# Patient Record
Sex: FEMALE | Race: WHITE | Employment: UNEMPLOYED | URBAN - METROPOLITAN AREA
[De-identification: names, ages, dates, MRNs, and addresses within clinical notes are randomized per-mention and may not be internally consistent; named-entity substitution may affect disease eponyms.]

---

## 2022-01-06 PROBLEM — E75.23: Status: ACTIVE | Noted: 2022-01-06

## 2022-01-06 PROBLEM — Z34.90 PREGNANCY: Status: ACTIVE | Noted: 2022-01-06

## 2022-01-06 PROBLEM — R87.619 ABNORMAL PAP SMEAR OF CERVIX: Status: ACTIVE | Noted: 2022-01-06

## 2022-01-06 PROBLEM — Z98.891 HISTORY OF C-SECTION: Status: ACTIVE | Noted: 2022-01-06

## 2022-01-06 PROBLEM — O34.219 HX SUCCESSFUL VBAC (VAGINAL BIRTH AFTER CESAREAN), CURRENTLY PREGNANT: Status: ACTIVE | Noted: 2022-01-06

## 2022-01-06 PROBLEM — Q31.5 LARYNGOMALACIA: Status: ACTIVE | Noted: 2022-01-06

## 2022-01-10 PROBLEM — Z67.91 RH NEGATIVE STATUS DURING PREGNANCY: Status: ACTIVE | Noted: 2022-01-10

## 2022-01-10 PROBLEM — O26.899 RH NEGATIVE STATUS DURING PREGNANCY: Status: ACTIVE | Noted: 2022-01-10

## 2022-03-02 ENCOUNTER — HOSPITAL ENCOUNTER (OUTPATIENT)
Age: 27
Discharge: HOME | End: 2022-03-02
Attending: OBSTETRICS & GYNECOLOGY | Admitting: OBSTETRICS & GYNECOLOGY
Payer: COMMERCIAL

## 2022-03-02 VITALS
BODY MASS INDEX: 20.2 KG/M2 | WEIGHT: 114 LBS | SYSTOLIC BLOOD PRESSURE: 106 MMHG | DIASTOLIC BLOOD PRESSURE: 55 MMHG | RESPIRATION RATE: 18 BRPM | HEIGHT: 63 IN | HEART RATE: 75 BPM

## 2022-03-02 PROBLEM — O26.892 VAGINAL DISCHARGE IN PREGNANCY IN SECOND TRIMESTER: Status: ACTIVE | Noted: 2022-03-02

## 2022-03-02 PROBLEM — N89.8 VAGINAL DISCHARGE IN PREGNANCY IN SECOND TRIMESTER: Status: ACTIVE | Noted: 2022-03-02

## 2022-03-02 LAB
A1 MICROGLOB PLACENTAL VAG QL: NEGATIVE
CONTROL LINE PRESENT?: NORMAL
EXPIRATION DATE: NORMAL
GLUCOSE, GLUUPC: NEGATIVE
INTERNAL NEGATIVE CONTROL: NORMAL
KETONES UR-MCNC: NORMAL MG/DL
KIT LOT NO.: NORMAL
PROT UR QL: NEGATIVE

## 2022-03-02 PROCEDURE — 75810000275 HC EMERGENCY DEPT VISIT NO LEVEL OF CARE

## 2022-03-02 PROCEDURE — 81002 URINALYSIS NONAUTO W/O SCOPE: CPT | Performed by: OBSTETRICS & GYNECOLOGY

## 2022-03-02 PROCEDURE — 84112 EVAL AMNIOTIC FLUID PROTEIN: CPT | Performed by: OBSTETRICS & GYNECOLOGY

## 2022-03-02 PROCEDURE — 99284 EMERGENCY DEPT VISIT MOD MDM: CPT

## 2022-03-02 NOTE — PROGRESS NOTES
Patient discharged to home stable with bleeding precautions. Patient encouraged to increase her water intake. Patient to follow up with her primary OB as needed. Questions answered and encouraged.

## 2022-03-02 NOTE — DISCHARGE INSTRUCTIONS
Patient Education   Patient Education        Weeks 14 to 25 of Your Pregnancy: Care Instructions  Overview     During this time, you may start to \"show,\" so that you look pregnant to people around you. You may also notice some changes in your skin, such as itchy spots on your palms or acne on your face. Your baby is now able to pass urine. And your baby's first stool (meconium) is starting to collect in your baby's intestines. Hair is also starting to grow on your baby's head. At your next visit, between weeks 18 and 20, your doctor may do an ultrasound test. The test allows your doctor to check for certain problems. Your doctor can also tell the sex of your baby. So this a good time to think about whether you want to know. Talk to your doctor about getting a flu shot to help keep you healthy during your pregnancy. As your pregnancy moves along, it's common to worry or feel anxious. Your body is changing a lot. And you are thinking about giving birth, the health of your baby, and becoming a parent. You can talk to your doctor about any anxiety and stress you feel. Follow-up care is a key part of your treatment and safety. Be sure to make and go to all appointments, and call your doctor if you are having problems. It's also a good idea to know your test results and keep a list of the medicines you take. How can you care for yourself at home? Reduce stress    · Ask for help with cooking and housekeeping.     · Figure out who or what causes your stress. Avoid these people or situations as much as possible.     · Relax every day. Taking 10- to 15-minute breaks can make a big difference. Take a walk, listen to music, or take a warm bath.     · Learn relaxation techniques at prenatal or yoga class. Or buy a relaxation tape.     · List your fears about having a baby and becoming a parent. Share the list with someone you trust. Decide which worries are really small, and try to let them go.    Exercise    · If you did not exercise much before pregnancy, start slowly. Walking is best. [employer], and do a little more every day.     · Brisk walking, easy jogging, low-impact aerobics, water aerobics, and yoga are good choices. Some sports, such as scuba diving, horseback riding, downhill skiing, gymnastics, and water skiing, are not a good idea.     · Try to do at least 2½ hours a week of moderate exercise, such as a fast walk. One way to do this is to be active 30 minutes a day, at least 5 days a week.     · Wear loose clothing. And wear shoes and a bra that provide good support.     · Warm up and cool down to start and finish your exercise.     · If you want to use weights, be sure to use light weights. They reduce stress on your joints. Stay at the best weight for you    · Experts recommend that you gain about 1 pound a month during the first 3 months of your pregnancy.     · Experts recommend that you gain about 1 pound a week during your last 6 months of pregnancy, for a total weight gain of 25 to 35 pounds.     · If you are underweight, you will need to gain more weight (about 28 to 40 pounds).     · If you are overweight, you may not need to gain as much weight (about 15 to 25 pounds).     · If you are gaining weight too fast, use common sense. Exercise every day, and limit sweets, fast foods, and fats. Choose lean meats, fruits, and vegetables.     · If you are having twins or more, your doctor may refer you to a dietitian. Where can you learn more? Go to http://www.gray.com/  Enter I453 in the search box to learn more about \"Weeks 14 to 18 of Your Pregnancy: Care Instructions. \"  Current as of: June 16, 2021               Content Version: 13.0  © 4840-5087 Healthwise, Incorporated. Care instructions adapted under license by [employer] (which disclaims liability or warranty for this information).  If you have questions about a medical condition or this instruction, always ask your healthcare professional. [place] [address] any warranty or liability for your use of this information. Vaginal Bleeding During Pregnancy: Care Instructions  Overview     It's common to have some vaginal bleeding when you are pregnant. In some cases, the bleeding isn't serious. And there aren't any more problems with the pregnancy. But sometimes bleeding is a sign of a more serious problem. This is more common if the bleeding is heavy or painful. Examples of more serious problems include miscarriage, an ectopic pregnancy, and a problem with the placenta. You may have to see your doctor again to be sure everything is okay. You may also need more tests to find the cause of the bleeding. Home treatment may be all you need. But it depends on what is causing the bleeding. Be sure to tell your doctor if you have any new symptoms or if your symptoms get worse. The doctor has checked you carefully, but problems can develop later. If you notice any problems or new symptoms, get medical treatment right away. Follow-up care is a key part of your treatment and safety. Be sure to make and go to all appointments, and call your doctor if you are having problems. It's also a good idea to know your test results and keep a list of the medicines you take. How can you care for yourself at home? · If your doctor prescribed medicines, take them exactly as directed. Call your doctor if you think you are having a problem with your medicine. · Do not have sex until your doctor says it's okay. · Do not put anything in your vagina until your doctor says it's okay. · Ask your doctor about other activities you can or can't do. · Get a lot of rest. Being pregnant can make you tired. · Eat a healthy diet. Include a lot of peas, beans, and leafy green vegetables. Talk to a dietitian if you need help planning your diet.   · Do not use nonsteroidal anti-inflammatory drugs (NSAIDs), such as ibuprofen (Advil, Motrin), naproxen (Aleve), or aspirin, unless your doctor says it is okay. When should you call for help? Call 911 anytime you think you may need emergency care. For example, call if:    · You passed out (lost consciousness).     · You have severe vaginal bleeding. Call your doctor now or seek immediate medical care if:    · You have any vaginal bleeding.     · You have pain in your belly or pelvis.     · You think that you are in labor.     · You have a sudden release of fluid from your vagina.     · You notice that your baby has stopped moving or is moving much less than normal.   Watch closely for changes in your health, and be sure to contact your doctor if you have any questions or concerns. Where can you learn more? Go to http://www.gray.com/  Enter N833 in the search box to learn more about \"Vaginal Bleeding During Pregnancy: Care Instructions. \"  Current as of: June 16, 2021               Content Version: 13.0  © 2006-2021 Healthwise, Incorporated. Care instructions adapted under license by [employer] (which disclaims liability or warranty for this information). If you have questions about a medical condition or this instruction, always ask your healthcare professional. [address] any warranty or liability for your use of this information.

## 2022-03-02 NOTE — H&P
History & Physical    Name: Chantelle Mendenhall MRN: [id number]  SSN: [id number]    YOB: 1995  Age: 31 y.o. Sex: female        Subjective: lof  HPI: 32 yo  presents at 16+1wks c/o lof throughout the day prior. She denies vb. She denies vaginal itching or burning. She denies cramping. She has a h/o 3 term deliveries. Her pregnancy has been c/b 1 prior CD, Rh neg, 2nd child with laryngomalacia. Estimated Date of Delivery: 22  OB History    Para Term  AB Living   5 3 3   1 3   SAB IAB Ectopic Molar Multiple Live Births           0 3      # Outcome Date GA Lbr Ciro/2nd Weight Sex Delivery Anes PTL Lv   5 Current            4 Term 19 40w0d  3.629 kg M VACD EPI N JOSHUA   3 Term 17 39w0d  2.665 kg F VACD EPI N JOSHUA   2 Term 13 39w0d  3.402 kg F CS-LTranv Spinal N JOSHUA      Complications: Breech presentation, Oligohydramnios   1 AB                Past Medical History:   Diagnosis Date    Abnormal Papanicolaou smear of cervix     had abnormal in last pregnancy. none since.     Postpartum depression     no meds, second pregnancy     Past Surgical History:   Procedure Laterality Date    APPENDECTOMY,W OTHR C      HX  SECTION      HX CYST REMOVAL      HX HERNIA REPAIR      umbilical     Social History     Occupational History    Not on file   Tobacco Use    Smoking status: Never Smoker    Smokeless tobacco: Never Used   Substance and Sexual Activity    Alcohol use: No    Drug use: No    Sexual activity: Yes     Partners: Male     Birth control/protection: None     Family History   Problem Relation Age of Onset    OSTEOARTHRITIS Mother     Diabetes Father     Lung Cancer Father     Other Brother         pre-cancer polyps       Allergies   Allergen Reactions    Adhesive Other (comments)     Severe skin irritation; skin comes off with tape    Dilaudid [Hydromorphone] Itching    Morphine Itching     Prior to Admission medications Medication Sig Start Date End Date Taking? Authorizing Provider   OTHER Takes 2 flinstones daily. Yes Provider, Historical        Review of Systems: Pertinent items are noted in HPI. 10 point ROS is otherwise negative. Objective:     Vitals:  Vitals:    228 22 015   BP: (!) 106/55    Pulse: 75    Resp: 18    Weight:  51.7 kg (114 lb)   Height:  5' 3\" (1.6 m)        Physical Exam:  Patient without distress. Abdomen: soft, nontender  Fundus: soft and non tender  Perineum: blood absent, amniotic fluid absent  SSE: NEFG, normal vaginal mucosa and discharge, no odor, cvx LTC to visual inspection  Lower Extremities:  - Edema No  TAUS: Normal appearing fluid around active fetus  Membranes:  Intact, Amnisure NEGATIVE  Fetal Heart Rate: Doptones 160s    Prenatal Labs:   Lab Results   Component Value Date/Time    Rubella, External immune 2022 12:00 AM    HBsAg, External negative 2022 12:00 AM    HIV, External NR 2022 12:00 AM    RPR, External NR 2022 12:00 AM    Gonorrhea, External negative 2022 12:00 AM    Chlamydia, External negative 2022 12:00 AM    ABO,Rh A negative 2022 12:00 AM         Assessment/Plan: 32 yo  at 16+1wks presented with vaginal discharge. No evidence of rupture. Reassuring fetal assessment.       Active Problems:    Vaginal discharge in pregnancy in second trimester (3/2/2022)         Plan:  Discharge to home  Routine OB follow up and precautions

## 2022-03-02 NOTE — PROGRESS NOTES
Patient presents to triage from ED with complaints of a gush of fluid that has continued to trickle states that her panties were wet when she took them off. Patient has not felt any fetal movement yet. FHT's with doppler 162, having some lower back pain. Patient states that she her second OB visit yesterday and everything was fine. Patient did have intercourse 2 days ago denies any bleeding.

## 2022-03-02 NOTE — PROGRESS NOTES
Dr Sumit Smith in room to assess patient, speculum exam done. No fluid noted normal vaginal discharge per Dr Sumit Smith.

## 2022-03-18 PROBLEM — O34.219 HX SUCCESSFUL VBAC (VAGINAL BIRTH AFTER CESAREAN), CURRENTLY PREGNANT: Status: ACTIVE | Noted: 2022-01-06

## 2022-03-18 PROBLEM — Q31.5 LARYNGOMALACIA: Status: ACTIVE | Noted: 2022-01-06

## 2022-03-19 PROBLEM — Z98.891 HISTORY OF C-SECTION: Status: ACTIVE | Noted: 2022-01-06

## 2022-03-19 PROBLEM — R87.619 ABNORMAL PAP SMEAR OF CERVIX: Status: ACTIVE | Noted: 2022-01-06

## 2022-03-19 PROBLEM — Z67.91 RH NEGATIVE STATUS DURING PREGNANCY: Status: ACTIVE | Noted: 2022-01-10

## 2022-03-19 PROBLEM — O26.899 RH NEGATIVE STATUS DURING PREGNANCY: Status: ACTIVE | Noted: 2022-01-10

## 2022-03-19 PROBLEM — E75.23: Status: ACTIVE | Noted: 2022-01-06

## 2022-03-20 PROBLEM — N89.8 VAGINAL DISCHARGE IN PREGNANCY IN SECOND TRIMESTER: Status: ACTIVE | Noted: 2022-03-02

## 2022-03-20 PROBLEM — O26.892 VAGINAL DISCHARGE IN PREGNANCY IN SECOND TRIMESTER: Status: ACTIVE | Noted: 2022-03-02

## 2022-03-20 PROBLEM — Z34.90 PREGNANCY: Status: ACTIVE | Noted: 2022-01-06

## 2022-05-10 ENCOUNTER — HOSPITAL ENCOUNTER (EMERGENCY)
Age: 27
Discharge: HOME | End: 2022-05-10
Attending: OBSTETRICS & GYNECOLOGY | Admitting: OBSTETRICS & GYNECOLOGY
Payer: COMMERCIAL

## 2022-05-10 ENCOUNTER — APPOINTMENT (OUTPATIENT)
Dept: GENERAL RADIOLOGY | Age: 27
End: 2022-05-10
Attending: EMERGENCY MEDICINE
Payer: COMMERCIAL

## 2022-05-10 ENCOUNTER — APPOINTMENT (OUTPATIENT)
Dept: CT IMAGING | Age: 27
End: 2022-05-10
Attending: EMERGENCY MEDICINE
Payer: COMMERCIAL

## 2022-05-10 VITALS
RESPIRATION RATE: 16 BRPM | OXYGEN SATURATION: 99 % | SYSTOLIC BLOOD PRESSURE: 109 MMHG | TEMPERATURE: 98 F | HEART RATE: 79 BPM | DIASTOLIC BLOOD PRESSURE: 52 MMHG

## 2022-05-10 DIAGNOSIS — O99.013 MATERNAL IRON DEFICIENCY ANEMIA COMPLICATING PREGNANCY IN THIRD TRIMESTER: ICD-10-CM

## 2022-05-10 DIAGNOSIS — D50.9 MATERNAL IRON DEFICIENCY ANEMIA COMPLICATING PREGNANCY IN THIRD TRIMESTER: ICD-10-CM

## 2022-05-10 DIAGNOSIS — R55 NEAR SYNCOPE: ICD-10-CM

## 2022-05-10 DIAGNOSIS — R07.9 CHEST PAIN, UNSPECIFIED TYPE: Primary | ICD-10-CM

## 2022-05-10 PROBLEM — O26.893 HEADACHE IN PREGNANCY, ANTEPARTUM, THIRD TRIMESTER: Status: ACTIVE | Noted: 2022-05-10

## 2022-05-10 PROBLEM — R51.9 HEADACHE IN PREGNANCY, ANTEPARTUM, THIRD TRIMESTER: Status: ACTIVE | Noted: 2022-05-10

## 2022-05-10 PROBLEM — R42 POSTURAL DIZZINESS WITH PRESYNCOPE: Status: ACTIVE | Noted: 2022-05-10

## 2022-05-10 PROBLEM — O99.891 CHEST PAIN DURING PREGNANCY: Status: ACTIVE | Noted: 2022-05-10

## 2022-05-10 LAB
ALBUMIN SERPL-MCNC: 2.6 G/DL (ref 3.5–5)
ALBUMIN/GLOB SERPL: 0.7 {RATIO} (ref 1.2–3.5)
ALP SERPL-CCNC: 66 U/L (ref 50–130)
ALT SERPL-CCNC: 10 U/L (ref 12–65)
ANION GAP SERPL CALC-SCNC: 8 MMOL/L (ref 7–16)
AST SERPL-CCNC: 10 U/L (ref 15–37)
ATRIAL RATE: 81 BPM
ATRIAL RATE: 86 BPM
B PERT DNA SPEC QL NAA+PROBE: NOT DETECTED
BILIRUB SERPL-MCNC: 0.3 MG/DL (ref 0.2–1.1)
BORDETELLA PARAPERTUSSIS PCR, BORPAR: NOT DETECTED
BUN SERPL-MCNC: 7 MG/DL (ref 6–23)
C PNEUM DNA SPEC QL NAA+PROBE: NOT DETECTED
CALCIUM SERPL-MCNC: 8.4 MG/DL (ref 8.3–10.4)
CALCULATED P AXIS, ECG09: 49 DEGREES
CALCULATED P AXIS, ECG09: 56 DEGREES
CALCULATED R AXIS, ECG10: 52 DEGREES
CALCULATED R AXIS, ECG10: 53 DEGREES
CALCULATED T AXIS, ECG11: 2 DEGREES
CALCULATED T AXIS, ECG11: 49 DEGREES
CHLORIDE SERPL-SCNC: 107 MMOL/L (ref 98–107)
CO2 SERPL-SCNC: 24 MMOL/L (ref 21–32)
CREAT SERPL-MCNC: 0.54 MG/DL (ref 0.6–1)
CREAT UR-MCNC: 167 MG/DL
DIAGNOSIS, 93000: NORMAL
DIAGNOSIS, 93000: NORMAL
ERYTHROCYTE [DISTWIDTH] IN BLOOD BY AUTOMATED COUNT: 14.1 % (ref 11.9–14.6)
FLUAV SUBTYP SPEC NAA+PROBE: NOT DETECTED
FLUBV RNA SPEC QL NAA+PROBE: NOT DETECTED
GLOBULIN SER CALC-MCNC: 3.8 G/DL (ref 2.3–3.5)
GLUCOSE SERPL-MCNC: 83 MG/DL (ref 65–100)
HADV DNA SPEC QL NAA+PROBE: NOT DETECTED
HCOV 229E RNA SPEC QL NAA+PROBE: NOT DETECTED
HCOV HKU1 RNA SPEC QL NAA+PROBE: NOT DETECTED
HCOV NL63 RNA SPEC QL NAA+PROBE: NOT DETECTED
HCOV OC43 RNA SPEC QL NAA+PROBE: NOT DETECTED
HCT VFR BLD AUTO: 28.4 % (ref 35.8–46.3)
HGB BLD-MCNC: 8.9 G/DL (ref 11.7–15.4)
HMPV RNA SPEC QL NAA+PROBE: NOT DETECTED
HPIV1 RNA SPEC QL NAA+PROBE: NOT DETECTED
HPIV2 RNA SPEC QL NAA+PROBE: NOT DETECTED
HPIV3 RNA SPEC QL NAA+PROBE: NOT DETECTED
HPIV4 RNA SPEC QL NAA+PROBE: NOT DETECTED
LDH SERPL L TO P-CCNC: 151 U/L (ref 100–190)
LIPASE SERPL-CCNC: 97 U/L (ref 73–393)
M PNEUMO DNA SPEC QL NAA+PROBE: NOT DETECTED
MCH RBC QN AUTO: 23.2 PG (ref 26.1–32.9)
MCHC RBC AUTO-ENTMCNC: 31.3 G/DL (ref 31.4–35)
MCV RBC AUTO: 74 FL (ref 79.6–97.8)
NRBC # BLD: 0 K/UL (ref 0–0.2)
P-R INTERVAL, ECG05: 130 MS
P-R INTERVAL, ECG05: 144 MS
PLATELET # BLD AUTO: 204 K/UL (ref 150–450)
PMV BLD AUTO: 10.5 FL (ref 9.4–12.3)
POTASSIUM SERPL-SCNC: 3.6 MMOL/L (ref 3.5–5.1)
PROT SERPL-MCNC: 6.4 G/DL (ref 6.3–8.2)
PROT UR-MCNC: 20 MG/DL
PROT/CREAT UR-RTO: 0.1
Q-T INTERVAL, ECG07: 382 MS
Q-T INTERVAL, ECG07: 394 MS
QRS DURATION, ECG06: 80 MS
QRS DURATION, ECG06: 88 MS
QTC CALCULATION (BEZET), ECG08: 457 MS
QTC CALCULATION (BEZET), ECG08: 457 MS
RBC # BLD AUTO: 3.84 M/UL (ref 4.05–5.2)
RSV RNA SPEC QL NAA+PROBE: NOT DETECTED
RV+EV RNA SPEC QL NAA+PROBE: NOT DETECTED
SARS-COV-2 PCR, COVPCR: NOT DETECTED
SODIUM SERPL-SCNC: 139 MMOL/L (ref 136–145)
TROPONIN-HIGH SENSITIVITY: 4.1 PG/ML (ref 0–14)
TROPONIN-HIGH SENSITIVITY: 4.5 PG/ML (ref 0–14)
URATE SERPL-MCNC: 3.7 MG/DL (ref 2.6–6)
VENTRICULAR RATE, ECG03: 81 BPM
VENTRICULAR RATE, ECG03: 86 BPM
WBC # BLD AUTO: 11.7 K/UL (ref 4.3–11.1)

## 2022-05-10 PROCEDURE — 71045 X-RAY EXAM CHEST 1 VIEW: CPT

## 2022-05-10 PROCEDURE — 93005 ELECTROCARDIOGRAM TRACING: CPT | Performed by: EMERGENCY MEDICINE

## 2022-05-10 PROCEDURE — 96361 HYDRATE IV INFUSION ADD-ON: CPT

## 2022-05-10 PROCEDURE — 81003 URINALYSIS AUTO W/O SCOPE: CPT

## 2022-05-10 PROCEDURE — 59025 FETAL NON-STRESS TEST: CPT

## 2022-05-10 PROCEDURE — 84156 ASSAY OF PROTEIN URINE: CPT

## 2022-05-10 PROCEDURE — 99285 EMERGENCY DEPT VISIT HI MDM: CPT

## 2022-05-10 PROCEDURE — 74011000636 HC RX REV CODE- 636: Performed by: EMERGENCY MEDICINE

## 2022-05-10 PROCEDURE — 80053 COMPREHEN METABOLIC PANEL: CPT

## 2022-05-10 PROCEDURE — 74011000250 HC RX REV CODE- 250: Performed by: EMERGENCY MEDICINE

## 2022-05-10 PROCEDURE — 84550 ASSAY OF BLOOD/URIC ACID: CPT

## 2022-05-10 PROCEDURE — 96374 THER/PROPH/DIAG INJ IV PUSH: CPT

## 2022-05-10 PROCEDURE — 71260 CT THORAX DX C+: CPT

## 2022-05-10 PROCEDURE — 83690 ASSAY OF LIPASE: CPT

## 2022-05-10 PROCEDURE — 0202U NFCT DS 22 TRGT SARS-COV-2: CPT

## 2022-05-10 PROCEDURE — 84484 ASSAY OF TROPONIN QUANT: CPT

## 2022-05-10 PROCEDURE — 74011250636 HC RX REV CODE- 250/636: Performed by: EMERGENCY MEDICINE

## 2022-05-10 PROCEDURE — 74011000258 HC RX REV CODE- 258: Performed by: EMERGENCY MEDICINE

## 2022-05-10 PROCEDURE — 74011250637 HC RX REV CODE- 250/637: Performed by: OBSTETRICS & GYNECOLOGY

## 2022-05-10 PROCEDURE — 85027 COMPLETE CBC AUTOMATED: CPT

## 2022-05-10 PROCEDURE — 83615 LACTATE (LD) (LDH) ENZYME: CPT

## 2022-05-10 RX ORDER — ONDANSETRON 4 MG/1
4 TABLET, ORALLY DISINTEGRATING ORAL
Qty: 10 TABLET | Refills: 0 | Status: SHIPPED | OUTPATIENT
Start: 2022-05-10

## 2022-05-10 RX ORDER — ONDANSETRON 2 MG/ML
4 INJECTION INTRAMUSCULAR; INTRAVENOUS
Status: DISCONTINUED | OUTPATIENT
Start: 2022-05-10 | End: 2022-05-10 | Stop reason: HOSPADM

## 2022-05-10 RX ORDER — LANOLIN ALCOHOL/MO/W.PET/CERES
325 CREAM (GRAM) TOPICAL
Qty: 60 TABLET | Refills: 1 | Status: SHIPPED | OUTPATIENT
Start: 2022-05-10

## 2022-05-10 RX ORDER — SODIUM CHLORIDE 0.9 % (FLUSH) 0.9 %
10 SYRINGE (ML) INJECTION
Status: COMPLETED | OUTPATIENT
Start: 2022-05-10 | End: 2022-05-10

## 2022-05-10 RX ORDER — ACETAMINOPHEN 325 MG/1
650 TABLET ORAL
COMMUNITY

## 2022-05-10 RX ORDER — TRISODIUM CITRATE DIHYDRATE AND CITRIC ACID MONOHYDRATE 500; 334 MG/5ML; MG/5ML
30 SOLUTION ORAL
Status: COMPLETED | OUTPATIENT
Start: 2022-05-10 | End: 2022-05-10

## 2022-05-10 RX ADMIN — FAMOTIDINE 20 MG: 10 INJECTION, SOLUTION INTRAVENOUS at 15:32

## 2022-05-10 RX ADMIN — SODIUM CHLORIDE 100 ML: 900 INJECTION, SOLUTION INTRAVENOUS at 16:49

## 2022-05-10 RX ADMIN — SODIUM CHLORIDE 1000 ML: 900 INJECTION, SOLUTION INTRAVENOUS at 14:58

## 2022-05-10 RX ADMIN — SODIUM CHLORIDE, PRESERVATIVE FREE 10 ML: 5 INJECTION INTRAVENOUS at 16:49

## 2022-05-10 RX ADMIN — SODIUM CITRATE AND CITRIC ACID MONOHYDRATE 30 ML: 500; 334 SOLUTION ORAL at 12:49

## 2022-05-10 RX ADMIN — IOPAMIDOL 100 ML: 755 INJECTION, SOLUTION INTRAVENOUS at 16:49

## 2022-05-10 NOTE — ED PROVIDER NOTES
25-year-old  with an estimated gestational age of 29 weeks 0 days with estimated due date of 2022 presents with complaint of mild headache, substernal chest pressure with radiation to right shoulder that is exacerbated by deep breath since yesterday at around 3 PM.  Patient was evaluated at L&D triage and sent back to ER for further work-up in regards to chest pain. Denies hemoptysis, cough, lower extremity swelling or calf tenderness, nausea, vomiting, fever, chills, abdominal pain, vaginal bleeding, vaginal discharge, leakage of fluids. Denies history of PE, DVT, CAD. Denies any exacerbating or alleviating factors. Denies vsion disturbance. Denies current headache. Denies numbness, tingling, weakness. See Dr. Antonella Whitt note in regards to her evaluation at L&D ED. Fetal Heart tones in 150s. The history is provided by the patient. No  was used. Chest Pain (Angina)   This is a new problem. The current episode started yesterday. The problem has not changed since onset. The problem occurs rarely. The pain is associated with normal activity. The pain is present in the substernal region and left side. The pain is at a severity of 3/10. The quality of the pain is described as pressure-like. The pain radiates to the right shoulder. Pertinent negatives include no abdominal pain, no back pain, no claudication, no cough, no diaphoresis, no dizziness, no exertional chest pressure, no fever, no headaches, no hemoptysis, no irregular heartbeat, no leg pain, no lower extremity edema, no malaise/fatigue, no nausea, no near-syncope, no numbness, no orthopnea, no palpitations, no PND, no shortness of breath, no sputum production, no vomiting and no weakness. She has tried nothing for the symptoms. The treatment provided no relief. Past Medical History:   Diagnosis Date    Abnormal Papanicolaou smear of cervix     had abnormal in last pregnancy. none since.     Maternal iron deficiency anemia affecting pregnancy in third trimester, antepartum 5/10/2022    Postpartum depression     no meds, second pregnancy       Past Surgical History:   Procedure Laterality Date    APPENDECTOMY,W OTHR C      HX  SECTION      HX CYST REMOVAL      HX HERNIA REPAIR      umbilical         Family History:   Problem Relation Age of Onset    OSTEOARTHRITIS Mother     Diabetes Father     Lung Cancer Father     Other Brother         pre-cancer polyps       Social History     Socioeconomic History    Marital status:      Spouse name: Not on file    Number of children: Not on file    Years of education: Not on file    Highest education level: Not on file   Occupational History    Not on file   Tobacco Use    Smoking status: Never Smoker    Smokeless tobacco: Never Used   Vaping Use    Vaping Use: Never used   Substance and Sexual Activity    Alcohol use: No    Drug use: No    Sexual activity: Yes     Partners: Male     Birth control/protection: None   Other Topics Concern     Service Not Asked    Blood Transfusions Not Asked    Caffeine Concern Not Asked    Occupational Exposure Not Asked    Hobby Hazards Not Asked    Sleep Concern Not Asked    Stress Concern Not Asked    Weight Concern Not Asked    Special Diet Not Asked    Back Care Not Asked    Exercise Not Asked    Bike Helmet Not Asked    Seat Belt Not Asked    Self-Exams Not Asked   Social History Narrative    Not on file     Social Determinants of Health     Financial Resource Strain:     Difficulty of Paying Living Expenses: Not on file   Food Insecurity:     Worried About Running Out of Food in the Last Year: Not on file    Aleida of Food in the Last Year: Not on file   Transportation Needs:     Lack of Transportation (Medical): Not on file    Lack of Transportation (Non-Medical):  Not on file   Physical Activity:     Days of Exercise per Week: Not on file    Minutes of Exercise per Session: Not on file   Stress:     Feeling of Stress : Not on file   Social Connections:     Frequency of Communication with Friends and Family: Not on file    Frequency of Social Gatherings with Friends and Family: Not on file    Attends Spiritism Services: Not on file    Active Member of Clubs or Organizations: Not on file    Attends Club or Organization Meetings: Not on file    Marital Status: Not on file   Intimate Partner Violence:     Fear of Current or Ex-Partner: Not on file    Emotionally Abused: Not on file    Physically Abused: Not on file    Sexually Abused: Not on file   Housing Stability:     Unable to Pay for Housing in the Last Year: Not on file    Number of [place] in the Last Year: Not on file    Unstable Housing in the Last Year: Not on file         ALLERGIES: Adhesive, Dilaudid [hydromorphone], and Morphine    Review of Systems   Constitutional: Negative for chills, diaphoresis, fatigue, fever and malaise/fatigue. HENT: Negative for congestion and rhinorrhea. Eyes: Negative for photophobia, pain and redness. Respiratory: Negative for cough, hemoptysis, sputum production and shortness of breath. Cardiovascular: Positive for chest pain. Negative for palpitations, orthopnea, claudication, leg swelling, PND and near-syncope. Gastrointestinal: Negative for abdominal pain, diarrhea, nausea and vomiting. Genitourinary: Negative for dysuria, flank pain, hematuria, pelvic pain, vaginal bleeding, vaginal discharge and vaginal pain. Musculoskeletal: Negative for back pain, myalgias, neck pain and neck stiffness. Skin: Negative for rash. Neurological: Negative for dizziness, seizures, syncope, weakness, light-headedness, numbness and headaches. Hematological: Does not bruise/bleed easily.        Vitals:    05/10/22 1149 05/10/22 1236 05/10/22 1238 05/10/22 1331   BP: (!) 119/58 (!) 104/55 (!) 118/56 (!) 97/50   Pulse: 96 80 99 82   Resp: 16 18 18 16   Temp: 98.6 °F (37 °C)   98 °F (36.7 °C)   SpO2: 100% 100% 100% 100%            Physical Exam  Vitals and nursing note reviewed. Constitutional:       Appearance: Normal appearance. HENT:      Head: Normocephalic. Nose: Nose normal.      Mouth/Throat:      Mouth: Mucous membranes are moist.   Eyes:      Extraocular Movements: Extraocular movements intact. Conjunctiva/sclera: Conjunctivae normal.      Pupils: Pupils are equal, round, and reactive to light. Cardiovascular:      Rate and Rhythm: Normal rate. Pulses: Normal pulses. Heart sounds: Normal heart sounds. Pulmonary:      Effort: Pulmonary effort is normal.      Breath sounds: Normal breath sounds. Comments: CTAB. Abdominal:      General: Bowel sounds are normal.      Palpations: Abdomen is soft. Tenderness: There is no abdominal tenderness. There is no right CVA tenderness, left CVA tenderness, guarding or rebound. Comments: Gravid abdomen. Soft, nontender, no rebound or guarding. No peritoneal signs. No CVA tenderness. Musculoskeletal:         General: No swelling. Normal range of motion. Right lower leg: No edema. Left lower leg: No edema. Comments: No lower extremity edema. No calf tenderness. No palpable cords. Skin:     General: Skin is warm. Findings: No erythema or rash. Neurological:      General: No focal deficit present. Mental Status: She is alert and oriented to person, place, and time. Cranial Nerves: No cranial nerve deficit. Sensory: No sensory deficit. Motor: No weakness. MDM  Number of Diagnoses or Management Options  Chest pain, unspecified type: new and requires workup  Maternal iron deficiency anemia complicating pregnancy in third trimester: new and requires workup  Near syncope  Diagnosis management comments: Patient evaluated at L&D ED. Patient noted to have microcytic anemia. Positive orthostatics. EKG with normal sinus rhythm. No ischemic changes noted. Will add on troponin to labs drawn earlier today. Additionally will repeat troponin in ED. 1 L normal saline IV fluid bolus ordered. Discussed obtaining imaging with patient and need for shield over abdomen. Discussed risk and potential radiation exposure. Discussed need for chest x-ray. Patient in agreement to undergo chest x-ray with shield.  ===========================================  Discussed case with [facility] hospitalist and her OB/GYN Dr. Shikha Aguilera. Dr. Shikha Aguilera stated that if any concern for PE changes obtain CT PE protocol. Given patient with pleuritic chest discomfort will obtain CT. Discussed risk with patient. Discussed that she-year-old and potential radiation exposure. Patient states that she is willing to accept risks for scan. CT PE protocol with no acute or concerning findings. No PE noted. Patient was informed by Dr. Dariana Echeverria that she needed to take iron supplementation. Patient requesting nausea medicine. Patient given strict return precautions. Amount and/or Complexity of Data Reviewed  Clinical lab tests: ordered and reviewed  Tests in the radiology section of CPT®: ordered and reviewed  Tests in the medicine section of CPT®: ordered and reviewed  Review and summarize past medical records: yes  Independent visualization of images, tracings, or specimens: yes    Risk of Complications, Morbidity, and/or Mortality  Presenting problems: high  Diagnostic procedures: high  Management options: high  General comments: Results Include:    Recent Results (from the past 24 hour(s))  -PROTEIN/CREATININE RATIO, URINE:   Collection Time: 05/10/22 12:22 PM       Result                      Value             Ref Range           Protein, urine random       20                mg/dL               Creatinine, urine           167.00            mg/dL               Protein/Creat.  urine R*     0.1                              -CBC W/O DIFF:   Collection Time: 05/10/22 12:27 PM       Result Value             Ref Range           WBC                         11.7 (H)          4.3 - 11.1 K*       RBC                         3.84 (L)          4.05 - 5.2 M*       HGB                         8.9 (L)           11.7 - 15.4 *       HCT                         28.4 (L)          35.8 - 46.3 %       MCV                         74.0 (L)          79.6 - 97.8 *       MCH                         23.2 (L)          26.1 - 32.9 *       MCHC                        31.3 (L)          31.4 - 35.0 *       RDW                         14.1              11.9 - 14.6 %       PLATELET                    204               150 - 450 K/*       MPV                         10.5              9.4 - 12.3 FL       ABSOLUTE NRBC               0.00              0.0 - 0.2 K/*  -METABOLIC PANEL, COMPREHENSIVE:   Collection Time: 05/10/22 12:27 PM       Result                      Value             Ref Range           Sodium                      139               136 - 145 mm*       Potassium                   3.6               3.5 - 5.1 mm*       Chloride                    107               98 - 107 mmo*       CO2                         24                21 - 32 mmol*       Anion gap                   8                 7 - 16 mmol/L       Glucose                     83                65 - 100 mg/*       BUN                         7                 6 - 23 MG/DL        Creatinine                  0.54 (L)          0.6 - 1.0 MG*       GFR est AA                  >60               >60 ml/min/1*       GFR est non-AA              >60               >60 ml/min/1*       Calcium                     8.4               8.3 - 10.4 M*       Bilirubin, total            0.3               0.2 - 1.1 MG*       ALT (SGPT)                  10 (L)            12 - 65 U/L         AST (SGOT)                  10 (L)            15 - 37 U/L         Alk.  phosphatase            66                50 - 130 U/L        Protein, total              6.4               6.3 - 8.2 g/*       Albumin                     2.6 (L)           3.5 - 5.0 g/*       Globulin                    3.8 (H)           2.3 - 3.5 g/*       A-G Ratio                   0.7 (L)           1.2 - 3.5      -URIC ACID:   Collection Time: 05/10/22 12:27 PM       Result                      Value             Ref Range           Uric acid                   3.7               2.6 - 6.0 MG*  -LD:   Collection Time: 05/10/22 12:27 PM       Result                      Value             Ref Range           LD                          151               100 - 190 U/L  -TROPONIN-HIGH SENSITIVITY:   Collection Time: 05/10/22 12:27 PM       Result                      Value             Ref Range           Troponin-High Sensitiv*     4.5               0 - 14 pg/mL   -LIPASE:   Collection Time: 05/10/22 12:27 PM       Result                      Value             Ref Range           Lipase                      97                73 - 393 U/L   -EKG, 12 LEAD, INITIAL:   Collection Time: 05/10/22  1:20 PM       Result                      Value             Ref Range           Ventricular Rate            86                BPM                 Atrial Rate                 86                BPM                 P-R Interval                130               ms                  QRS Duration                80                ms                  Q-T Interval                382               ms                  QTC Calculation (Bezet)     457               ms                  Calculated P Axis           56                degrees             Calculated R Axis           52                degrees             Calculated T Axis           49                degrees             Diagnosis                                                     Normal sinus rhythm with sinus arrhythmia Normal ECG No previous ECGs available Confirmed by ST JONES LOPEZ MD (), TOMASA SHAH ([id number]) on 5/10/2022 2:45:20 PM   -RESPIRATORY VIRUS PANEL W/COVID-19, PCR:   Collection Time: 05/10/22  2:36 PM  Specimen: Nasopharyngeal       Result                      Value             Ref Range           Adenovirus                  NOT DETECTED      NOTDET              Coronavirus 229E            NOT DETECTED      NOTDET              Coronavirus HKU1            NOT DETECTED      NOTDET              Coronavirus CVNL63          NOT DETECTED      NOTDET              Coronavirus OC43            NOT DETECTED      NOTDET              SARS-CoV-2, PCR             NOT DETECTED      NOTDET              Metapneumovirus             NOT DETECTED      NOTDET              Rhinovirus and Enterov*     NOT DETECTED      NOTDET              Influenza A                 NOT DETECTED      NOTDET              Influenza B                 NOT DETECTED      NOTDET              Parainfluenza 1             NOT DETECTED      NOTDET              Parainfluenza 2             NOT DETECTED      NOTDET              Parainfluenza 3             NOT DETECTED      NOTDET              Parainfluenza virus 4       NOT DETECTED      NOTDET              RSV by PCR                  NOT DETECTED      NOTDET              B. parapertussis, PCR       NOT DETECTED      NOTDET              Bordetella pertussis -*     NOT DETECTED      NOTDET              Chlamydophila pneumoni*     NOT DETECTED      NOTDET              Mycoplasma pneumoniae *     NOT DETECTED      NOTDET         -TROPONIN-HIGH SENSITIVITY:   Collection Time: 05/10/22  2:36 PM       Result                      Value             Ref Range           Troponin-High Sensitiv*     4.1               0 - 14 pg/mL       Patient Progress  Patient progress: stable    ED Course as of 05/10/22 1801   Tue May 10, 2022   1457 Troponin-High Sensitivity: 4.5 [DF]   1520 CXR Findings: The cardiac silhouette is normal in respect to size. The lungs are expanded  without evidence for pneumothorax. No consolidative airspace process or  pleural effusion is seen.     IMPRESSION  1.   No acute cardiopulmonary process evident on single frontal view of the  chest. [DF]   1744 CT Chest FINDINGS:  - LUNGS: No infiltrates, masses, or effusions.  - HEART/VESSELS: Normal enhancement of pulmonary arteries and aorta. Heart size  is normal.     - MEDIASTINUM/AXILLA: No significant adenopathy.  - CHEST WALL/BONES: Normal.  - UPPER ABDOMEN: No acute findings.      IMPRESSION  No evidence of pulmonary embolus. No acute findings.    [DF]      ED Course User Index  [DF] Carie Swain MD       EKG    Date/Time: 5/10/2022 2:18 PM  Performed by: Carie Swain MD  Authorized by: Carie Swain MD     ECG reviewed by ED Physician in the absence of a cardiologist: yes    Rate:     ECG rate:  81    ECG rate assessment: normal    Rhythm:     Rhythm: sinus rhythm    Ectopy:     Ectopy: none    QRS:     QRS axis:  Normal    QRS intervals:  Normal  Conduction:     Conduction: normal    ST segments:     ST segments:  Normal  T waves:     T waves: inverted      Inverted:  III                   Corbin Wright MD; 5/10/2022 @2:18 PM Voice dictation software was used during the making of this note. This software is not perfect and grammatical and other typographical errors may be present.   This note has not been proofread for errors.  ===================================================================

## 2022-05-10 NOTE — PROGRESS NOTES
Orders to transfer to ED for evaluation of chest pain. Transferred via wheelchair with RN at side. Stable condition.

## 2022-05-10 NOTE — ED NOTES
I have reviewed discharge instructions with the patient. The patient verbalized understanding. Patient left ED via Discharge Method: ambulatory to Home with mother    Opportunity for questions and clarification provided. No acute distress present      Patient given 3 scripts. To continue your aftercare when you leave the hospital, you may receive an automated call from our care team to check in on how you are doing. This is a free service and part of our promise to provide the best care and service to meet your aftercare needs.  If you have questions, or wish to unsubscribe from this service please call [phone]. Thank you for Choosing our [address] [place] [address] Emergency Department. [employer] Preamble Text (Leave Blank If You Do Not Want): Extensive wide undermining was performed.

## 2022-05-10 NOTE — PROGRESS NOTES
Pt to room [facility] for triage with chief complaint of blurry vision, headache and chest pain. Onset was yesterday. Reports syncope episode yesterday at 1700 while in shower.  caught her. Assessment begins, EFM and [place] applied to a soft non tender abdomen and tracing well. Dr Nati Oates called to assess patient.

## 2022-05-10 NOTE — DISCHARGE INSTRUCTIONS
Patient Education        Pregnancy Precautions: Care Instructions  Your Care Instructions     There is no sure way to prevent labor before your due date ( labor) or to prevent most other pregnancy problems. But there are things you can do to increase your chances of a healthy pregnancy. Go to your appointments, follow your doctor's advice, and take good care of yourself. Eat well, and exercise (if your doctor agrees). And make sure to drink plenty of water. Follow-up care is a key part of your treatment and safety. Be sure to make and go to all appointments, and call your doctor if you are having problems. It's also a good idea to know your test results and keep a list of the medicines you take. How can you care for yourself at home? · Make sure you go to your prenatal appointments. At each visit, your doctor will check your blood pressure. Your doctor will also check to see if you have protein in your urine. High blood pressure and protein in urine are signs of preeclampsia. This condition can be dangerous for you and your baby. · Drink plenty of fluids. Dehydration can cause contractions. If you have kidney, heart, or liver disease and have to limit fluids, talk with your doctor before you increase the amount of fluids you drink. · Tell your doctor right away if you notice any symptoms of an infection, such as:  ? Burning when you urinate. ? A foul-smelling discharge from your vagina. ? Vaginal itching. ? Unexplained fever. ? Unusual pain or soreness in your uterus or lower belly. · Eat a balanced diet. Include plenty of foods that are high in calcium and iron. ? Foods high in calcium include milk, cheese, yogurt, almonds, and broccoli. ? Foods high in iron include red meat, shellfish, poultry, eggs, beans, raisins, whole-grain bread, and leafy green vegetables. · Do not smoke. If you need help quitting, talk to your doctor about stop-smoking programs and medicines.  These can increase your chances of quitting for good. · Do not drink alcohol or use marijuana or illegal drugs. · Follow your doctor's directions about activity. Your doctor will let you know how much, if any, exercise you can do. · Ask your doctor if you can have sex. If you are at risk for early labor, your doctor may ask you to not have sex. · Take care to prevent falls. During pregnancy, your joints are loose, and your balance is off. Sports such as bicycling, skiing, or in-line skating can increase your risk of falling. And don't ride horses or motorcycles, dive, water ski, scuba dive, or parachute jump while you are pregnant. · Avoid things that can make your body too hot and may be harmful to your baby, such as a hot tub or sauna. Or talk with your doctor before doing anything that raises your body temperature. Your doctor can tell you if it's safe. · Do not take any over-the-counter or herbal medicines or supplements without talking to your doctor or pharmacist first.  When should you call for help? Call 911  anytime you think you may need emergency care. For example, call if:    · You passed out (lost consciousness).     · You have a seizure.     · You have severe vaginal bleeding.     · You have severe pain in your belly or pelvis.     · You have had fluid gushing or leaking from your vagina and you know or think the umbilical cord is bulging into your vagina. If this happens, immediately get down on your knees so your rear end (buttocks) is higher than your head. This will decrease the pressure on the cord until help arrives. Call your doctor now or seek immediate medical care if:    · You have signs of preeclampsia, such as:  ? Sudden swelling of your face, hands, or feet. ? New vision problems (such as dimness, blurring, or seeing spots).   ? A severe headache.     · You have any vaginal bleeding.     · You have belly pain or cramping.     · You have a fever.     · You have had regular contractions (with or without pain) for an hour. This means that you have 8 or more within 1 hour or 4 or more in 20 minutes after you change your position and drink fluids.     · You have a sudden release of fluid from your vagina.     · You have low back pain or pelvic pressure that does not go away.     · You notice that your baby has stopped moving or is moving much less than normal.   Watch closely for changes in your health, and be sure to contact your doctor if you have any problems. Where can you learn more? Go to http://www.mims.com/  Enter Y951 in the search box to learn more about \"Pregnancy Precautions: Care Instructions. \"  Current as of: June 16, 2021               Content Version: 13.2  © 2006-2022 Healthwise, [employer]. Care instructions adapted under license by [employer] (which disclaims liability or warranty for this information). If you have questions about a medical condition or this instruction, always ask your healthcare professional. [address] any warranty or liability for your use of this information.

## 2022-05-10 NOTE — H&P
History & Physical    Name: Elisabeth Baumann MRN: [id number]  SSN: [id number]    YOB: 1995  Age: 31 y.o. Sex: female      Subjective: 32 yo  at 26w0d presents with complaint of HA, blurred vision, presyncopal episode yesterday at 1700. Feeling sharp pain from her lower back shooting to right shoulder. She sent the office a message and was instructed to come to ED for evaluation. Reason for Admission:  Pregnancy and presyncopal episode, HA, chest pain    History of Present Illness: Ms. Raquel López is a 31 y.o.  female with an estimated gestational age of 34w0d with Estimated Date of Delivery: 22. Patient complains of HA, blurred vision, chest pain for 1 days. Pregnancy has been uncomplicated other than prior CS and hx of successful  x 2. Patient denies abdominal pain  , contractions, fever, nausea and vomiting, pelvic pressure, right upper quadrant pain  , shortness of breath, swelling, vaginal bleeding , vaginal leaking of fluid  and visual disturbances. OB History    Para Term  AB Living   5 3 3   1 3   SAB IAB Ectopic Molar Multiple Live Births           0 3      # Outcome Date GA Lbr Ciro/2nd Weight Sex Delivery Anes PTL Lv   5 Current            4 Term 19 40w0d  3.629 kg M VACD EPI N JOSHUA   3 Term 17 39w0d  2.665 kg F VACD EPI N JOSHUA   2 Term 13 39w0d  3.402 kg F CS-LTranv Spinal N JOSHUA      Complications: Breech presentation, Oligohydramnios   1 AB              Past Medical History:   Diagnosis Date    Abnormal Papanicolaou smear of cervix     had abnormal in last pregnancy. none since.     Maternal iron deficiency anemia affecting pregnancy in third trimester, antepartum 5/10/2022    Postpartum depression     no meds, second pregnancy     Past Surgical History:   Procedure Laterality Date    APPENDECTOMY,W OTHR C      HX  SECTION      HX CYST REMOVAL      HX HERNIA REPAIR      umbilical     Social History Occupational History    Not on file   Tobacco Use    Smoking status: Never Smoker    Smokeless tobacco: Never Used   Vaping Use    Vaping Use: Never used   Substance and Sexual Activity    Alcohol use: No    Drug use: No    Sexual activity: Yes     Partners: Male     Birth control/protection: None      Family History   Problem Relation Age of Onset    OSTEOARTHRITIS Mother     Diabetes Father     Lung Cancer Father     Other Brother         pre-cancer polyps       Allergies   Allergen Reactions    Adhesive Other (comments)     Severe skin irritation; skin comes off with tape    Dilaudid [Hydromorphone] Itching    Morphine Itching     Prior to Admission medications    Medication Sig Start Date End Date Taking? Authorizing Provider   acetaminophen (TylenoL) 325 mg tablet Take 650 mg by mouth every four (4) hours as needed for Pain. Yes Provider, Historical   OTHER Takes 2 flinstones daily. Yes Provider, Historical        Review of Systems:  A comprehensive review of systems was negative except for that written in the History of Present Illness. Objective:     Vitals:    Vitals:    05/10/22 1149 05/10/22 1236 05/10/22 1238   BP: (!) 119/58 (!) 104/55 (!) 118/56   Pulse: 96 80 99   Resp: 16     Temp: 98.6 °F (37 °C)     SpO2: 100% 100%       Temp (24hrs), Av.6 °F (37 °C), Min:98.6 °F (37 °C), Max:98.6 °F (37 °C)    BP  Min: 104/55  Max: 119/58     Physical Exam:  Patient without distress.   Heart: Regular rate and rhythm or S1S2 present  Lung: clear to auscultation throughout lung fields, no wheezes, no rales, no rhonchi and normal respiratory effort  Back: costovertebral angle tenderness absent  Abdomen: soft, nontender  Fundus: soft and non tender  Cervical Exam: deferred  Lower Extremities:  - Edema No     Membranes:  Intact  Uterine Activity:  None  Fetal Heart Rate:  Reactive  Baseline: 150 beats per minute  Variability: moderate  Accelerations: yes  Decelerations: none  Uterine contractions: none       Lab/Data Review:  Recent Results (from the past 24 hour(s))   CBC W/O DIFF    Collection Time: 05/10/22 12:27 PM   Result Value Ref Range    WBC 11.7 (H) 4.3 - 11.1 K/uL    RBC 3.84 (L) 4.05 - 5.2 M/uL    HGB 8.9 (L) 11.7 - 15.4 g/dL    HCT 28.4 (L) 35.8 - 46.3 %    MCV 74.0 (L) 79.6 - 97.8 FL    MCH 23.2 (L) 26.1 - 32.9 PG    MCHC 31.3 (L) 31.4 - 35.0 g/dL    RDW 14.1 11.9 - 14.6 %    PLATELET 233 700 - 111 K/uL    MPV 10.5 9.4 - 12.3 FL    ABSOLUTE NRBC 0.00 0.0 - 0.2 K/uL       Assessment and Plan: Active Problems:    Headache in pregnancy, antepartum, third trimester (5/10/2022)      Postural dizziness with presyncope (5/10/2022)      Chest pain during pregnancy (5/10/2022)      Maternal iron deficiency anemia affecting pregnancy in third trimester, antepartum (5/10/2022)    She is stable, no acute findings on exam currently. Orthostatic VS as noted. Reviewed symptoms are all likely due to anemia. Discussed iron supplementation. To main ED for EKG, patient has fu in office in 2 weeks. Precautions reviewed. FKCs reviewed and recommended daily. Note sent to Dr. Dover Peers regarding symptomatic anemia and possible need for heme referral for IV Fe infusions. Recommend iron sulfate 325 mg po BID plus stool softener OTC. Information given. Patient expressed understanding.

## 2022-05-13 DIAGNOSIS — O99.019 ANTEPARTUM ANEMIA: Primary | ICD-10-CM

## 2022-05-13 DIAGNOSIS — Z13.0 SCREENING FOR IRON DEFICIENCY ANEMIA: ICD-10-CM

## 2022-05-20 DIAGNOSIS — Z3A.26 26 WEEKS GESTATION OF PREGNANCY: ICD-10-CM

## 2022-05-20 DIAGNOSIS — Z34.82 PRENATAL CARE, SUBSEQUENT PREGNANCY IN SECOND TRIMESTER: Primary | ICD-10-CM

## 2022-05-20 DIAGNOSIS — D64.9 ANEMIA, UNSPECIFIED TYPE: ICD-10-CM

## 2022-05-24 ENCOUNTER — ROUTINE PRENATAL (OUTPATIENT)
Dept: OBGYN CLINIC | Age: 27
End: 2022-05-24
Payer: COMMERCIAL

## 2022-05-24 VITALS — BODY MASS INDEX: 22.21 KG/M2 | WEIGHT: 125.4 LBS | DIASTOLIC BLOOD PRESSURE: 60 MMHG | SYSTOLIC BLOOD PRESSURE: 98 MMHG

## 2022-05-24 DIAGNOSIS — Z13.0 SCREENING FOR IRON DEFICIENCY ANEMIA: ICD-10-CM

## 2022-05-24 DIAGNOSIS — Z13.89 SCREENING FOR GENITOURINARY CONDITION: ICD-10-CM

## 2022-05-24 DIAGNOSIS — O26.892 RH NEGATIVE STATUS DURING PREGNANCY IN SECOND TRIMESTER: ICD-10-CM

## 2022-05-24 DIAGNOSIS — Z34.82 PRENATAL CARE, SUBSEQUENT PREGNANCY, SECOND TRIMESTER: Primary | ICD-10-CM

## 2022-05-24 DIAGNOSIS — Z13.1 SCREENING FOR DIABETES MELLITUS: ICD-10-CM

## 2022-05-24 DIAGNOSIS — Z3A.28 28 WEEKS GESTATION OF PREGNANCY: ICD-10-CM

## 2022-05-24 DIAGNOSIS — Z67.91 RH NEGATIVE STATUS DURING PREGNANCY IN SECOND TRIMESTER: ICD-10-CM

## 2022-05-24 PROBLEM — O26.899 RH NEGATIVE STATUS DURING PREGNANCY: Status: ACTIVE | Noted: 2022-01-10

## 2022-05-24 LAB
BLOOD GROUP ANTIBODIES SERPL: NORMAL
GLUCOSE 1 HOUR: 49 MG/DL
GLUCOSE URINE, POC: NEGATIVE
HGB BLD-MCNC: 9.1 G/DL (ref 11.7–15.4)
PROTEIN,URINE, POC: NORMAL

## 2022-05-24 PROCEDURE — 96372 THER/PROPH/DIAG INJ SC/IM: CPT | Performed by: OBSTETRICS & GYNECOLOGY

## 2022-05-24 PROCEDURE — 0502F SUBSEQUENT PRENATAL CARE: CPT | Performed by: OBSTETRICS & GYNECOLOGY

## 2022-05-24 RX ORDER — FERROUS SULFATE 325(65) MG
325 TABLET ORAL
Status: ON HOLD | COMMUNITY
Start: 2019-04-13 | End: 2022-08-14 | Stop reason: HOSPADM

## 2022-05-24 NOTE — PROGRESS NOTES
Today glucola and hg.  ptl precautions. Will be a  likely. Signed  consent. Rhogam and antibody screen today. Will bring back sooner for us for efw with size less than dates.

## 2022-05-29 ENCOUNTER — HOSPITAL ENCOUNTER (OUTPATIENT)
Age: 27
Discharge: HOME | End: 2022-05-29
Attending: OBSTETRICS & GYNECOLOGY | Admitting: OBSTETRICS & GYNECOLOGY
Payer: COMMERCIAL

## 2022-05-29 VITALS
SYSTOLIC BLOOD PRESSURE: 117 MMHG | HEIGHT: 64 IN | BODY MASS INDEX: 21.52 KG/M2 | HEART RATE: 72 BPM | DIASTOLIC BLOOD PRESSURE: 57 MMHG | RESPIRATION RATE: 18 BRPM

## 2022-05-29 PROBLEM — E75.23: Status: ACTIVE | Noted: 2022-01-06

## 2022-05-29 PROBLEM — N89.8 VAGINAL DISCHARGE IN PREGNANCY IN SECOND TRIMESTER: Status: RESOLVED | Noted: 2022-03-02 | Resolved: 2022-05-29

## 2022-05-29 PROBLEM — R87.619 ABNORMAL PAP SMEAR OF CERVIX: Status: ACTIVE | Noted: 2022-01-06

## 2022-05-29 PROBLEM — O26.892 VAGINAL DISCHARGE IN PREGNANCY IN SECOND TRIMESTER: Status: RESOLVED | Noted: 2022-03-02 | Resolved: 2022-05-29

## 2022-05-29 PROCEDURE — 99285 EMERGENCY DEPT VISIT HI MDM: CPT

## 2022-05-29 PROCEDURE — 76817 TRANSVAGINAL US OBSTETRIC: CPT

## 2022-05-29 PROCEDURE — 59025 FETAL NON-STRESS TEST: CPT

## 2022-05-29 NOTE — PROGRESS NOTES
Patient discharged to home stable. PTL and bleeding precautions explained to patient. Patient verbalize understanding. Questions answered and encouraged.

## 2022-05-29 NOTE — PROGRESS NOTES
Patient presents to triage from home with complaints of cramping since 2130 and passed some mucus discharge. Reports good fetal movement. Denies any bleeding or leaking.

## 2022-05-29 NOTE — H&P
OB ED Provider Note    Name: Negra Smiley MRN: [id number]     YOB: 1995  Age: 31 y.o. Sex: female      Subjective:     Reason for Presentation to [facility] ED:  cramping    History of Present Illness: Ms. Peter Garcia is a 31 y.o.  at 28w5d gestation with Estimated Date of Delivery: 22. Her current obstetrical history is significant for prior  x1 followed by 2 successful VBACs. Prenatal records reviewed. Had some cramping 2-3 days ago but it was mild and went away. Last night began having intermittent cramping again associated with pelvic pressure. She states it did not feel like contractions. She then noted some mucus discharge that looked a little like a mucus plug, so decided to come in for evaluation. Last had intercourse about 24 hours ago. She reports good fetal movement. She denies vaginal bleeding. She denies leakage of fluid. OB History    Para Term  AB Living   5 3 3   1 3   SAB IAB Ectopic Molar Multiple Live Births     1       3      # Outcome Date GA Lbr Quincy/2nd Weight Sex Delivery Anes PTL Lv   5 Current            4 Term 19 40w0d 06:08 / 00:14 8 lb (3.63 kg) M  EPI N SHANNAN      Birth Comments: Had some tachypnea and mild retractions at birth. Mom had VBACHep B givenCirc performedPassed hearing and CCHD   3 Term 17 39w3d / 02:21 5 lb 15 oz (2.693 kg) F Vag-Spont EPI N SHANNAN   2 Term 13 39w0d  7 lb 8 oz (3.402 kg) F CS-LTranv Spinal N SHANNAN      Complications: Breech presentation   1 IAB         FD     Past Medical History:   Diagnosis Date    Abnormal Papanicolaou smear of cervix     had abnormal in last pregnancy. none since.     Chest pain during pregnancy     Maternal iron deficiency anemia affecting pregnancy in third trimester, antepartum 5/10/2022    Postpartum depression     no meds, second pregnancy    Postural dizziness with presyncope      Past Surgical History:   Procedure Laterality Date    APPENDECTOMY,W OTHR C       SECTION      CYST REMOVAL      HERNIA REPAIR      umbilical     Social History     Occupational History    Not on file   Tobacco Use    Smoking status: Never Smoker    Smokeless tobacco: Never Used   Substance and Sexual Activity    Alcohol use: No    Drug use: No    Sexual activity: Not on file        Allergies   Allergen Reactions    Hydromorphone Itching    Morphine Itching     Extreme itching    Adhesive Tape Other (See Comments)     Severe skin irritation; skin comes off with tape       Prior to Admission medications    Medication Sig Start Date End Date Taking? Authorizing Provider   ferrous sulfate (IRON 325) 325 (65 Fe) MG tablet Take 325 mg by mouth 19   Historical Provider, MD   Prenatal Vit-Fe Fumarate-FA (PRENATAL VITAMINS PO) Take by mouth    Historical Provider, MD          Objective:     Vitals:  BP (!) 117/57   Pulse 72   Resp 18   Ht 5' 4\" (1.626 m)   LMP 2021   BMI 21.52 kg/m²          Wt Readings from Last 1 Encounters:   22 125 lb 6.4 oz (56.9 kg)       Physical Exam:  Normal vital signs  General Appearance:  in no acute distress  FHTs: Variability: Good {> 6 bpm), Accelerations: Reactive and Category I strip  Uterine activity/Contractions: Uterine irritability at first, but no regular contractions; irritability resolved during observation  Membranes: intact  Cervix: closed, 40% effacement, Floating station    Lab/Data Review & Summary:  Transvaginal ultrasound: Cervix length over 5 cms    Assessment:  28w5d gestation  Not in labor. Uterine irritability resolved.   Obstetrical history significant for prior  x1 followed by 2 successful VBACs  Reassuring fetal status      Plan:  discharge home, follow-up at next regular OB appointment

## 2022-05-31 ENCOUNTER — ROUTINE PRENATAL (OUTPATIENT)
Dept: OBGYN CLINIC | Age: 27
End: 2022-05-31
Payer: COMMERCIAL

## 2022-05-31 VITALS — WEIGHT: 125.2 LBS | SYSTOLIC BLOOD PRESSURE: 98 MMHG | DIASTOLIC BLOOD PRESSURE: 60 MMHG | BODY MASS INDEX: 21.49 KG/M2

## 2022-05-31 DIAGNOSIS — Z13.89 SCREENING FOR GENITOURINARY CONDITION: ICD-10-CM

## 2022-05-31 DIAGNOSIS — Z34.83 PRENATAL CARE, SUBSEQUENT PREGNANCY, THIRD TRIMESTER: Primary | ICD-10-CM

## 2022-05-31 DIAGNOSIS — Z3A.29 29 WEEKS GESTATION OF PREGNANCY: ICD-10-CM

## 2022-05-31 DIAGNOSIS — O26.843 UTERINE SIZE-DATE DISCREPANCY IN THIRD TRIMESTER: ICD-10-CM

## 2022-05-31 LAB
GLUCOSE URINE, POC: NEGATIVE
PROTEIN,URINE, POC: NEGATIVE

## 2022-05-31 PROCEDURE — 0502F SUBSEQUENT PRENATAL CARE: CPT | Performed by: OBSTETRICS & GYNECOLOGY

## 2022-05-31 PROCEDURE — 76805 OB US >/= 14 WKS SNGL FETUS: CPT | Performed by: OBSTETRICS & GYNECOLOGY

## 2022-05-31 NOTE — PROGRESS NOTES
EFW performed secondary to Size<Dates   +FHR= 137 BPM  Overall EFW= 48%  AC= 30%  JENNY= 15 cm Cephalic   Posterior Placenta, Grade 1

## 2022-06-14 DIAGNOSIS — Z13.0 SCREENING FOR IRON DEFICIENCY ANEMIA: ICD-10-CM

## 2022-06-14 DIAGNOSIS — O99.019 ANTEPARTUM ANEMIA: ICD-10-CM

## 2022-06-14 LAB — DAT POLY-SP REAG RBC QL: NORMAL

## 2022-06-15 LAB
BACTERIA URNS QL MICRO: ABNORMAL /HPF
CASTS URNS QL MICRO: ABNORMAL /LPF
CRYSTALS URNS QL MICRO: 0 /LPF
EPI CELLS #/AREA URNS HPF: ABNORMAL /HPF
MUCOUS THREADS URNS QL MICRO: ABNORMAL /LPF
OTHER OBSERVATIONS: ABNORMAL
RBC #/AREA URNS HPF: ABNORMAL /HPF
WBC URNS QL MICRO: ABNORMAL /HPF

## 2022-06-16 ENCOUNTER — TELEMEDICINE (OUTPATIENT)
Dept: ONCOLOGY | Age: 27
End: 2022-06-16
Payer: COMMERCIAL

## 2022-06-16 ENCOUNTER — CLINICAL DOCUMENTATION (OUTPATIENT)
Dept: ONCOLOGY | Age: 27
End: 2022-06-16

## 2022-06-16 DIAGNOSIS — D50.9 IRON DEFICIENCY ANEMIA, UNSPECIFIED IRON DEFICIENCY ANEMIA TYPE: ICD-10-CM

## 2022-06-16 DIAGNOSIS — O99.019 ANTEPARTUM ANEMIA: Primary | ICD-10-CM

## 2022-06-16 LAB
ANA SER QL: NEGATIVE
TRANSFERRIN SERPL-SCNC: 63.6 NMOL/L (ref 12.2–27.3)

## 2022-06-16 PROCEDURE — 99205 OFFICE O/P NEW HI 60 MIN: CPT | Performed by: PEDIATRICS

## 2022-06-16 RX ORDER — SODIUM CHLORIDE 9 MG/ML
5-250 INJECTION, SOLUTION INTRAVENOUS PRN
Status: CANCELLED | OUTPATIENT
Start: 2022-06-28

## 2022-06-16 RX ORDER — SODIUM CHLORIDE 9 MG/ML
INJECTION, SOLUTION INTRAVENOUS CONTINUOUS
Status: CANCELLED | OUTPATIENT
Start: 2022-06-28

## 2022-06-16 RX ORDER — HEPARIN SODIUM (PORCINE) LOCK FLUSH IV SOLN 100 UNIT/ML 100 UNIT/ML
500 SOLUTION INTRAVENOUS PRN
Status: CANCELLED | OUTPATIENT
Start: 2022-06-28

## 2022-06-16 RX ORDER — FAMOTIDINE 10 MG/ML
20 INJECTION, SOLUTION INTRAVENOUS
Status: CANCELLED | OUTPATIENT
Start: 2022-06-28

## 2022-06-16 RX ORDER — ALBUTEROL SULFATE 90 UG/1
4 AEROSOL, METERED RESPIRATORY (INHALATION) PRN
Status: CANCELLED | OUTPATIENT
Start: 2022-06-28

## 2022-06-16 RX ORDER — DIPHENHYDRAMINE HYDROCHLORIDE 50 MG/ML
50 INJECTION INTRAMUSCULAR; INTRAVENOUS
Status: CANCELLED | OUTPATIENT
Start: 2022-06-28

## 2022-06-16 RX ORDER — ACETAMINOPHEN 325 MG/1
650 TABLET ORAL
Status: CANCELLED | OUTPATIENT
Start: 2022-06-28

## 2022-06-16 RX ORDER — ONDANSETRON 2 MG/ML
8 INJECTION INTRAMUSCULAR; INTRAVENOUS
Status: CANCELLED | OUTPATIENT
Start: 2022-06-28

## 2022-06-16 RX ORDER — SODIUM CHLORIDE 0.9 % (FLUSH) 0.9 %
5-40 SYRINGE (ML) INJECTION PRN
Status: CANCELLED | OUTPATIENT
Start: 2022-06-28

## 2022-06-16 RX ORDER — EPINEPHRINE 1 MG/ML
0.3 INJECTION, SOLUTION, CONCENTRATE INTRAVENOUS PRN
Status: CANCELLED | OUTPATIENT
Start: 2022-06-28

## 2022-06-16 ASSESSMENT — PATIENT HEALTH QUESTIONNAIRE - PHQ9
SUM OF ALL RESPONSES TO PHQ QUESTIONS 1-9: 0
SUM OF ALL RESPONSES TO PHQ9 QUESTIONS 1 & 2: 0
SUM OF ALL RESPONSES TO PHQ QUESTIONS 1-9: 0
2. FEELING DOWN, DEPRESSED OR HOPELESS: 0
1. LITTLE INTEREST OR PLEASURE IN DOING THINGS: 0

## 2022-06-16 NOTE — PROGRESS NOTES
HISTORY OF PRESENT ILLNESS  Lauren Lee is a 31 y.o. y.o. female with anemia  Services were provided through video using doxy. me discussing virtually to substitute for in-person clinic visit. Patient is aware that this is apatient-initiated Telehealth encounter is a billable services, with coverage as determined by his/her insurance carrier. Patient is aware that they may receive a bill and has provided verbal consent to proceed    ABSTRACT  Reason for Referral: Anemia, unspecified type; 26 weeks gestation of pregnancy     Referring Provider: Diana Guerra MD     Primary Care Provider: None     Family History of Cancer/Hematologic Disorders: Family history is significant for father with lung cancer.      Presenting Symptoms: Near syncope, blurry vision, headache, and chest pain     Narrative with recent with Results/Procedures/Biopsies and Dates completed: Ms. Janelle Domingo is a 41-year-old, , white female with a history of postpartum depression, abnormal pap smear of cervix, appendectomy,  section, cyst removal, and umbilical hernia repair, who is 30w0d pregnant with an [address] of 22. She presented to [facility] on 22 for new OB visit. Initial prenatal labs drawn the same day were significant for MCV 76, MCH 24.2, and RDW 15.6. HGB was within normal limits at 11.5.      On 5/10/22 she presented to L&D triage at [facility] reporting syncope, blurry vision, headache, chest pain, and sharp pain from her lower back shooting to her right shoulder. Labs drawn in L&D Triage were significant for WBC 11.7, RBC 3.84, HGB 8.9, HCT 28.4, MCV 74, MCH 23.2, and MCHC 31.3. Patient was examined and found to be stable with no acute findings on exam. MD discussed with patient that her symptoms were all likely due to anemia and discussed iron supplementation.  She was subsequently sent to the main ED for EKG. Work-up in the ED noted positive orthostatics.   EKG showed normal sinus rhythm with no ischemic changes noted. Troponin x 2 were normal. Chest x-ray showed no acute cardiopulmonary process and CT of the chest was negative for PE. Note was sent to patients OB regarding symptomatic anemia and possible need for heme referral for IV Fe infusions. Patient was recommended iron sulfate 325 mg po BID plus stool softener OTC.       Patient was subsequently referred to [facility], per OBGYN, for Hematology evaluation and consideration for treatment of anemia with IV iron infusions. Most recent HGB drawn on 5/24/22 had improved to 9.1.        1/6/2022 15:58 5/10/2022 12:27 5/24/2022 15:20   WBC 5.8 11.7 (H)     RBC 4.75 3.84 (L)     Hemoglobin Quant 11.5 8.9 (L) 9.1 (L)   Hematocrit 35.9 28.4 (L)     MCV 76 (L) 74.0 (L)     MCH 24.2 (L) 23.2 (L)     MCHC 32.0 31.3 (L)     MPV   10.5     RDW 15.6 (H) 14.1     Platelet Count 129 910     Neutrophils % 72       Lymphocyte % 14       Monocytes % 13       Eosinophils % 1       Basophils % 0       Neutrophils Absolute 4.2       Lymphocytes Absolute 0.8       Monocytes Absolute 0.8       Eosinophils Absolute 0.0       Basophils Absolute 0.0       GRANULOCYTE ABSOLUTE COUNT 0.0       Immature Granulocytes 0              HPI: 4th pregnancy 31 weeks  Had oral iron in prior pregnancy  Patient Denies:  Nose bleeds  Gum bleeds  Bruising or petechia  Bleeding with surgery  Bleeding with accidents  Transfusions  History or free bleeding or hemophilia  Chest pain and fatigue    Patient Denies:   Fevers   Night Sweats   Chills   Weight Loss   Bone Pain   Lymphadenopathy  Patient Denies:  Nose bleeds  Gum bleeds  Bruising or petechia  Bleeding with surgery  Bleeding with accidents  Transfusions  History or free bleeding or hemophilia    Current Outpatient Medications   Medication Sig    ferrous sulfate (IRON 325) 325 (65 Fe) MG tablet Take 325 mg by mouth    Prenatal Vit-Fe Fumarate-FA (PRENATAL VITAMINS PO) Take by mouth     No current facility-administered medications for this visit. Past Medical History:   Diagnosis Date    Abnormal Papanicolaou smear of cervix     had abnormal in last pregnancy. none since.  Chest pain during pregnancy     Maternal iron deficiency anemia affecting pregnancy in third trimester, antepartum 5/10/2022    Postpartum depression     no meds, second pregnancy    Postural dizziness with presyncope        Past Surgical History:   Procedure Laterality Date    APPENDECTOMY,W OTHR C       SECTION      CYST REMOVAL      HERNIA REPAIR      umbilical       Family History   Problem Relation Age of Onset    Other Brother         pre-cancer polyps    Lung Cancer Father     Osteoarthritis Mother     Diabetes Father        Social History     Tobacco Use    Smoking status: Never Smoker    Smokeless tobacco: Never Used   Substance Use Topics    Alcohol use: No    Drug use: No       Immunization History   Administered Date(s) Administered    Influenza Virus Vaccine 2019    Rho (D) Immune Globulin 2017, 2019    Tdap (Boostrix, Adacel) 2019       Allergies   Allergen Reactions    Hydromorphone Itching    Morphine Itching     Extreme itching    Adhesive Tape Other (See Comments)     Severe skin irritation; skin comes off with tape           Review of Systems   Review of Systems   Constitutional: Negative. HENT: Negative. Eyes: Negative. Respiratory: Negative. Cardiovascular: Negative. Gastrointestinal: Negative. Genitourinary: Negative. Musculoskeletal: Negative. Skin: Negative. Neurological: Negative. Endo/Heme/Allergies: Negative. Psychiatric/Behavioral: Negative. Pain reviewed fully and addressed this visit  Med review and reconciliation addressed fully this visit  ADLs and performance level addressed, ECOG 0 unless otherwise addressed    Last menstrual period 2021.         Physical Exam:   Physical Exam (video)      Constitutional:      Well developed, well nourished male in no acute distress, sitting comfortably   HEENT:      deferred       Lymph node      deferred   Skin      No rash;    Respiratory      Non labored   CVS      deferred   Abdomen      deferred      Neuro      Grossly nonfocal    Mood/Psch  MMS intact, normal mood, good affect           Orders Only on 2022   Component Date Value Ref Range Status    Polyspecific Belkis 2022 NEG   Final    WBC, UA 2022 10-20  0 /hpf Final    RBC, UA 2022 0-3  0 /hpf Final    Epithelial Cells UA 2022 20-50  0 /hpf Final    BACTERIA, URINE 2022 3+* 0 /hpf Final    Casts 2022 HYALINE  0 /lpf Final    3-5    Crystals 2022 0  0 /LPF Final    Mucus, UA 2022 4+* 0 /lpf Final    OTHER OBSERVATIONS 2022 RESULTS VERIFIED MANUALLY    Final         Radiology:  CT Results (most recent):  No results found for this or any previous visit from the past 365 days. PET Results (most recent):  No results found for this or any previous visit from the past 365 days. ELIEL Results (most recent):  No results found for this or any previous visit from the past 365 days. US  No results found for this or any previous visit from the past 365 days. 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 y.o. yo with LEXIS in pregnancy and a history of HMB, LEXIS, with anemia not responsive to oral iron or intolerant of oral iron  1) LEXIS: likely related to prior HMB and ID of pregnancy.   Rule out other causes of anemia, work up pending. Recommended IV iron* and specifically addressed type of iron, route, side effects and risks/benefits and patient agrees to proceed. Patient knows to call for any questions or complications, but primary follow up with OB.    -injectafer 750mg IV x 2   -follow up 4 months post delivery to assess iron level and gauge further iron needs  2) HMB: by history and needs evaluation and management after resumption of menses  -follow up post partum 4 months  3) Bleeding Diathesis: approximately 10% risk of bleeding disorder, most common von willebrand or platelet function disorder. VWD testing discussed and will repeat in 3-6 months if first normal and platelet function testing with PFA, plt agg and glycoprotein expression evaluation if VW normal.  Smear to be evaluated for gray plt or megaplts Paralee Lofts). -testing to be completed after pregnancy due to physiologic elevation of VW  Follow up 4 months post delivery  All questions answered; call with issues  Total time 60 min 50% in direct consultation about the patient's diagnosis and management  Martin Duenas MD  Director, [address] and [address] [place] [address], [address] [place] [address]  AY Phone        Recent network meta-analysis has established that \"parenteral iron preparations are more effective at increasing hemoglobin concentrations compared with oral preparations\". Furthermore,  IV ferric carboxymaltose was shown to have an overall better impact on hemoglobin rise as compared to iv iron sucrose.     Amrit Loving, the Lancet/Haematology/ July 2021

## 2022-06-16 NOTE — PROGRESS NOTES
Patient labs were drawn in different office and will be scanned into media. Dr. Michael Dewey reviewed labs and would like patient set up for injectafer x2.  Email set to approvals team to submit labs for approval.

## 2022-06-16 NOTE — LETTER
[facility] HEMATOLOGY AND ONCOLOGY  [address]  [place] [address]  Phone: [phone]  Fax: [phone]           Alden Terry MD, MD      Thank you for referring Matthew Benz to the [facility] Oncology clinic at [address] [place] [address]. Please see the attached clinic note for details of Kandis Torres's assessment and plan.     Please don't hesitate to contact us with any questions and thank you again for the referral.    Best Regards,

## 2022-06-16 NOTE — PROGRESS NOTES
Patient labs were drawn in different office and will be scanned into media. Dr. Hao Sena reviewed labs and would like patient set up for injectafer x2.  Email set to approvals team to submit labs for approval.

## 2022-06-17 LAB
ALBUMIN SERPL-MCNC: 3 G/DL (ref 3.5–5)
ALBUMIN/GLOB SERPL: 0.9 {RATIO} (ref 1.2–3.5)
ALP SERPL-CCNC: 97 U/L (ref 50–136)
ALT SERPL-CCNC: 9 U/L (ref 12–65)
ANION GAP SERPL CALC-SCNC: 9 MMOL/L (ref 7–16)
APPEARANCE UR: ABNORMAL
AST SERPL-CCNC: 11 U/L (ref 15–37)
BASOPHILS # BLD: 0 K/UL (ref 0–0.2)
BASOPHILS NFR BLD: 0 % (ref 0–2)
BILIRUB SERPL-MCNC: 0.5 MG/DL (ref 0.2–1.1)
BILIRUB UR QL: ABNORMAL
BUN SERPL-MCNC: 4 MG/DL (ref 6–23)
CALCIUM SERPL-MCNC: 9.2 MG/DL (ref 8.3–10.4)
CHLORIDE SERPL-SCNC: 108 MMOL/L (ref 98–107)
CO2 SERPL-SCNC: 22 MMOL/L (ref 21–32)
COLOR UR: YELLOW
CREAT SERPL-MCNC: 0.6 MG/DL (ref 0.6–1)
DIFFERENTIAL METHOD BLD: ABNORMAL
EOSINOPHIL # BLD: 0.1 K/UL (ref 0–0.8)
EOSINOPHIL NFR BLD: 1 % (ref 0.5–7.8)
ERYTHROCYTE [DISTWIDTH] IN BLOOD BY AUTOMATED COUNT: 15.4 % (ref 11.9–14.6)
ERYTHROCYTE [SEDIMENTATION RATE] IN BLOOD: 16 MM/HR (ref 0–20)
FERRITIN SERPL-MCNC: 5 NG/ML (ref 8–388)
FOLATE SERPL-MCNC: 5.9 NG/ML (ref 3.1–17.5)
GLOBULIN SER CALC-MCNC: 3.3 G/DL (ref 2.3–3.5)
GLUCOSE SERPL-MCNC: 104 MG/DL (ref 65–100)
GLUCOSE UR STRIP.AUTO-MCNC: NEGATIVE MG/DL
HCT VFR BLD AUTO: 32.3 % (ref 35.8–46.3)
HGB BLD-MCNC: 9.2 G/DL (ref 11.7–15.4)
HGB RETIC QN AUTO: 20 PG (ref 29–35)
HGB UR QL STRIP: NEGATIVE
IMM GRANULOCYTES # BLD AUTO: 0.1 K/UL (ref 0–0.5)
IMM GRANULOCYTES NFR BLD AUTO: 1 % (ref 0–5)
IMM RETICS NFR: 30.7 % (ref 3–15.9)
IRON SATN MFR SERPL: 4 %
IRON SERPL-MCNC: 29 UG/DL (ref 35–150)
KETONES UR QL STRIP.AUTO: 40 MG/DL
LDH SERPL L TO P-CCNC: 168 U/L (ref 100–190)
LEUKOCYTE ESTERASE UR QL STRIP.AUTO: ABNORMAL
LYMPHOCYTES # BLD: 1.5 K/UL (ref 0.5–4.6)
LYMPHOCYTES NFR BLD: 14 % (ref 13–44)
MCH RBC QN AUTO: 21.2 PG (ref 26.1–32.9)
MCHC RBC AUTO-ENTMCNC: 28.5 G/DL (ref 31.4–35)
MCV RBC AUTO: 74.6 FL (ref 79.6–97.8)
MONOCYTES # BLD: 0.8 K/UL (ref 0.1–1.3)
MONOCYTES NFR BLD: 7 % (ref 4–12)
NEUTS SEG # BLD: 8.6 K/UL (ref 1.7–8.2)
NEUTS SEG NFR BLD: 78 % (ref 43–78)
NITRITE UR QL STRIP.AUTO: NEGATIVE
NRBC # BLD: 0 K/UL (ref 0–0.2)
PATH REV BLD -IMP: NORMAL
PH UR STRIP: 6.5 [PH] (ref 5–9)
PHOSPHATE SERPL-MCNC: 2.9 MG/DL (ref 2.5–4.5)
PLATELET # BLD AUTO: 212 K/UL (ref 150–450)
PMV BLD AUTO: 10.7 FL (ref 9.4–12.3)
POTASSIUM SERPL-SCNC: 3.8 MMOL/L (ref 3.5–5.1)
PROT SERPL-MCNC: 6.3 G/DL (ref 6.3–8.2)
PROT UR STRIP-MCNC: NEGATIVE MG/DL
RBC # BLD AUTO: 4.33 M/UL (ref 4.05–5.2)
RETICS # AUTO: 0.09 M/UL (ref 0.03–0.1)
RETICS/RBC NFR AUTO: 2.2 % (ref 0.3–2)
SODIUM SERPL-SCNC: 139 MMOL/L (ref 136–145)
SP GR UR REFRACTOMETRY: 1.02 (ref 1–1.02)
T4 FREE SERPL-MCNC: 1 NG/DL (ref 0.9–1.8)
TIBC SERPL-MCNC: 654 UG/DL (ref 250–450)
TSH, 3RD GENERATION: 0.46 UIU/ML (ref 0.36–3.74)
URATE SERPL-MCNC: 4.5 MG/DL (ref 2.6–6)
UROBILINOGEN UR QL STRIP.AUTO: 1 EU/DL (ref 0.2–1)
VIT B12 SERPL-MCNC: 107 PG/ML (ref 193–986)
WBC # BLD AUTO: 11.1 K/UL (ref 4.3–11.1)

## 2022-06-21 ENCOUNTER — ROUTINE PRENATAL (OUTPATIENT)
Dept: OBGYN CLINIC | Age: 27
End: 2022-06-21

## 2022-06-21 VITALS — BODY MASS INDEX: 22.21 KG/M2 | WEIGHT: 129.4 LBS | SYSTOLIC BLOOD PRESSURE: 102 MMHG | DIASTOLIC BLOOD PRESSURE: 70 MMHG

## 2022-06-21 DIAGNOSIS — Z13.89 SCREENING FOR GENITOURINARY CONDITION: ICD-10-CM

## 2022-06-21 DIAGNOSIS — Z3A.32 32 WEEKS GESTATION OF PREGNANCY: ICD-10-CM

## 2022-06-21 DIAGNOSIS — Z34.83 PRENATAL CARE, SUBSEQUENT PREGNANCY, THIRD TRIMESTER: Primary | ICD-10-CM

## 2022-06-21 LAB
GLUCOSE URINE, POC: NEGATIVE
PROTEIN,URINE, POC: NEGATIVE

## 2022-06-21 PROCEDURE — 99902 PR PRENATAL VISIT: CPT | Performed by: OBSTETRICS & GYNECOLOGY

## 2022-06-30 ENCOUNTER — HOSPITAL ENCOUNTER (OUTPATIENT)
Age: 27
Discharge: HOME | End: 2022-07-01
Attending: OBSTETRICS & GYNECOLOGY | Admitting: OBSTETRICS & GYNECOLOGY
Payer: COMMERCIAL

## 2022-07-01 VITALS
HEIGHT: 64 IN | HEART RATE: 93 BPM | TEMPERATURE: 98.9 F | SYSTOLIC BLOOD PRESSURE: 110 MMHG | BODY MASS INDEX: 23.05 KG/M2 | DIASTOLIC BLOOD PRESSURE: 56 MMHG | RESPIRATION RATE: 16 BRPM | WEIGHT: 135 LBS

## 2022-07-01 PROCEDURE — 99282 EMERGENCY DEPT VISIT SF MDM: CPT

## 2022-07-01 PROCEDURE — 59025 FETAL NON-STRESS TEST: CPT

## 2022-07-01 NOTE — ED TRIAGE NOTES
OB ED Provider Note    Name: Donna Carrion MRN: [id number]     YOB: 1995  Age: 31 y.o. Sex: female      Subjective:     Reason for Presentation to [facility] ED:  abdominal/pelvic cramping and pelvic pressure    History of Present Illness: Ms. Jerardo Garcia is a 31 y.o.  at 33w3d gestation with Estimated Date of Delivery: 22. Her current obstetrical history is significant for prior  x1 followed by 2 successful VBACs. Prenatal records reviewed. She reports good fetal movement. She had a small amount of pink-tinged mucus overnight. She denies leakage of fluid. She reports no regular contractions, just irregular cramping with pelvic pressure for a day or two. They are leaving on vacation tomorrow, and she wanted to be sure everything was okay before leaving. OB History    Para Term  AB Living   5 3 3   1 3   SAB IAB Ectopic Molar Multiple Live Births     1       3      # Outcome Date GA Lbr Quincy/2nd Weight Sex Delivery Anes PTL Lv   5 Current            4 Term 19 40w0d 06:08 / 00:14 8 lb (3.63 kg) M  EPI N SHANNAN      Birth Comments: Had some tachypnea and mild retractions at birth. Mom had VBACHep B givenCirc performedPassed hearing and CCHD   3 Term 17 39w3d / 02:21 5 lb 15 oz (2.693 kg) F Vag-Spont EPI N SHANNAN   2 Term 13 39w0d  7 lb 8 oz (3.402 kg) F CS-LTranv Spinal N SHANNAN      Complications: Breech presentation   1 IAB         FD     Past Medical History:   Diagnosis Date    Abnormal Papanicolaou smear of cervix     had abnormal in last pregnancy. none since.     Chest pain during pregnancy     Maternal iron deficiency anemia affecting pregnancy in third trimester, antepartum 5/10/2022    Postpartum depression     no meds, second pregnancy    Postural dizziness with presyncope      Past Surgical History:   Procedure Laterality Date    APPENDECTOMY,W OTHR C       SECTION      CYST REMOVAL      HERNIA REPAIR      umbilical Social History     Occupational History    Not on file   Tobacco Use    Smoking status: Never Smoker    Smokeless tobacco: Never Used   Substance and Sexual Activity    Alcohol use: No    Drug use: No    Sexual activity: Not on file        Allergies   Allergen Reactions    Hydromorphone Itching    Morphine Itching     Extreme itching    Adhesive Tape Other (See Comments)     Severe skin irritation; skin comes off with tape       Prior to Admission medications    Medication Sig Start Date End Date Taking? Authorizing Provider   ferrous sulfate (IRON 325) 325 (65 Fe) MG tablet Take 325 mg by mouth 19   Historical Provider, MD   Prenatal Vit-Fe Fumarate-FA (PRENATAL VITAMINS PO) Take by mouth    Historical Provider, MD          Objective:     Vitals:  BP (!) 110/56   Pulse 93   Temp 98.9 °F (37.2 °C) (Oral)   Resp 16   Ht 5' 4\" (1.626 m)   Wt 135 lb (61.2 kg)   LMP 2021   BMI 23.17 kg/m²          Wt Readings from Last 1 Encounters:   22 135 lb (61.2 kg)       Physical Exam:  Vital signs normal  General Appearance:  in no acute distress  FHTs: Reactive and reassuring and Category I strip  Uterine activity/Contractions: None seen on monitor  Membranes: intact  Cervix: closed dilated, 40% effacement, High station. Unchanged from my previous exam at 28 wks    Lab/Data Review & Summary:  No results found for this or any previous visit (from the past 24 hour(s)). Assessment:  33w3d gestation  Not in labor and No regular contractions present;    Obstetrical history significant for prior  x1 followed by 2 successful VBACs  Reassuring fetal status      Plan:  discharge home, follow-up at next regular OB appointment

## 2022-07-11 ENCOUNTER — HOSPITAL ENCOUNTER (OUTPATIENT)
Dept: INFUSION THERAPY | Age: 27
Discharge: HOME | End: 2022-07-11
Payer: COMMERCIAL

## 2022-07-11 VITALS
SYSTOLIC BLOOD PRESSURE: 103 MMHG | HEART RATE: 90 BPM | DIASTOLIC BLOOD PRESSURE: 53 MMHG | RESPIRATION RATE: 16 BRPM | OXYGEN SATURATION: 100 % | TEMPERATURE: 98.1 F

## 2022-07-11 DIAGNOSIS — D50.9 IRON DEFICIENCY ANEMIA, UNSPECIFIED IRON DEFICIENCY ANEMIA TYPE: Primary | ICD-10-CM

## 2022-07-11 PROCEDURE — 96365 THER/PROPH/DIAG IV INF INIT: CPT

## 2022-07-11 PROCEDURE — 2580000003 HC RX 258: Performed by: PEDIATRICS

## 2022-07-11 PROCEDURE — 6360000002 HC RX W HCPCS: Performed by: PEDIATRICS

## 2022-07-11 RX ORDER — ACETAMINOPHEN 325 MG/1
650 TABLET ORAL
OUTPATIENT
Start: 2022-07-18

## 2022-07-11 RX ORDER — HEPARIN SODIUM (PORCINE) LOCK FLUSH IV SOLN 100 UNIT/ML 100 UNIT/ML
500 SOLUTION INTRAVENOUS PRN
OUTPATIENT
Start: 2022-07-18

## 2022-07-11 RX ORDER — ONDANSETRON 2 MG/ML
8 INJECTION INTRAMUSCULAR; INTRAVENOUS
OUTPATIENT
Start: 2022-07-18

## 2022-07-11 RX ORDER — DIPHENHYDRAMINE HYDROCHLORIDE 50 MG/ML
50 INJECTION INTRAMUSCULAR; INTRAVENOUS
OUTPATIENT
Start: 2022-07-18

## 2022-07-11 RX ORDER — SODIUM CHLORIDE 0.9 % (FLUSH) 0.9 %
5-40 SYRINGE (ML) INJECTION PRN
OUTPATIENT
Start: 2022-07-18

## 2022-07-11 RX ORDER — SODIUM CHLORIDE 9 MG/ML
INJECTION, SOLUTION INTRAVENOUS CONTINUOUS
OUTPATIENT
Start: 2022-07-18

## 2022-07-11 RX ORDER — SODIUM CHLORIDE 9 MG/ML
5-250 INJECTION, SOLUTION INTRAVENOUS PRN
OUTPATIENT
Start: 2022-07-18

## 2022-07-11 RX ORDER — SODIUM CHLORIDE 0.9 % (FLUSH) 0.9 %
5-40 SYRINGE (ML) INJECTION PRN
Status: DISCONTINUED | OUTPATIENT
Start: 2022-07-11 | End: 2022-07-12 | Stop reason: HOSPADM

## 2022-07-11 RX ORDER — DIPHENHYDRAMINE HYDROCHLORIDE 50 MG/ML
50 INJECTION INTRAMUSCULAR; INTRAVENOUS
Status: ACTIVE | OUTPATIENT
Start: 2022-07-11 | End: 2022-07-11

## 2022-07-11 RX ORDER — SODIUM CHLORIDE 9 MG/ML
5-250 INJECTION, SOLUTION INTRAVENOUS PRN
Status: DISCONTINUED | OUTPATIENT
Start: 2022-07-11 | End: 2022-07-12 | Stop reason: HOSPADM

## 2022-07-11 RX ORDER — SODIUM CHLORIDE 9 MG/ML
5-250 INJECTION, SOLUTION INTRAVENOUS PRN
Status: CANCELLED | OUTPATIENT
Start: 2022-07-18

## 2022-07-11 RX ORDER — EPINEPHRINE 1 MG/ML
0.3 INJECTION, SOLUTION, CONCENTRATE INTRAVENOUS PRN
OUTPATIENT
Start: 2022-07-18

## 2022-07-11 RX ORDER — ALBUTEROL SULFATE 90 UG/1
4 AEROSOL, METERED RESPIRATORY (INHALATION) PRN
OUTPATIENT
Start: 2022-07-18

## 2022-07-11 RX ADMIN — FERRIC CARBOXYMALTOSE INJECTION 750 MG: 50 INJECTION, SOLUTION INTRAVENOUS at 14:28

## 2022-07-11 RX ADMIN — SODIUM CHLORIDE, PRESERVATIVE FREE 10 ML: 5 INJECTION INTRAVENOUS at 13:50

## 2022-07-11 RX ADMIN — SODIUM CHLORIDE, PRESERVATIVE FREE 10 ML: 5 INJECTION INTRAVENOUS at 15:18

## 2022-07-11 RX ADMIN — SODIUM CHLORIDE 25 ML/HR: 9 INJECTION, SOLUTION INTRAVENOUS at 13:50

## 2022-07-11 NOTE — PROGRESS NOTES
Arrived to the [facility]. Injectafer completed. Patient tolerated well. Any issues or concerns during appointment: no.  Patient aware of next infusion appointment on 7/18/2022 (date) at 1:30 pm (time). Patient aware of next lab and [facility] office visit on 12/15/2022 (date) at 6 am (time). Patient instructed to call provider with temperature of 100.4 or greater or nausea/vomiting/ diarrhea or pain not controlled by medications  Discharged ambulatory with self.

## 2022-07-13 ENCOUNTER — ROUTINE PRENATAL (OUTPATIENT)
Dept: OBGYN CLINIC | Age: 27
End: 2022-07-13

## 2022-07-13 VITALS — BODY MASS INDEX: 22.18 KG/M2 | SYSTOLIC BLOOD PRESSURE: 102 MMHG | WEIGHT: 129.2 LBS | DIASTOLIC BLOOD PRESSURE: 70 MMHG

## 2022-07-13 DIAGNOSIS — Z34.83 PRENATAL CARE, SUBSEQUENT PREGNANCY, THIRD TRIMESTER: Primary | ICD-10-CM

## 2022-07-13 DIAGNOSIS — Z13.89 SCREENING FOR GENITOURINARY CONDITION: ICD-10-CM

## 2022-07-13 DIAGNOSIS — Z3A.35 35 WEEKS GESTATION OF PREGNANCY: ICD-10-CM

## 2022-07-13 LAB
GLUCOSE URINE, POC: NEGATIVE
PROTEIN,URINE, POC: NEGATIVE

## 2022-07-13 PROCEDURE — 99902 PR PRENATAL VISIT: CPT | Performed by: OBSTETRICS & GYNECOLOGY

## 2022-07-18 ENCOUNTER — HOSPITAL ENCOUNTER (OUTPATIENT)
Dept: INFUSION THERAPY | Age: 27
Discharge: HOME | End: 2022-07-18
Payer: COMMERCIAL

## 2022-07-18 VITALS
OXYGEN SATURATION: 99 % | HEART RATE: 73 BPM | TEMPERATURE: 98.3 F | SYSTOLIC BLOOD PRESSURE: 112 MMHG | DIASTOLIC BLOOD PRESSURE: 65 MMHG

## 2022-07-18 DIAGNOSIS — D50.9 IRON DEFICIENCY ANEMIA, UNSPECIFIED IRON DEFICIENCY ANEMIA TYPE: Primary | ICD-10-CM

## 2022-07-18 PROCEDURE — 2580000003 HC RX 258: Performed by: PEDIATRICS

## 2022-07-18 PROCEDURE — 6360000002 HC RX W HCPCS: Performed by: PEDIATRICS

## 2022-07-18 PROCEDURE — 96365 THER/PROPH/DIAG IV INF INIT: CPT

## 2022-07-18 RX ORDER — ONDANSETRON 2 MG/ML
8 INJECTION INTRAMUSCULAR; INTRAVENOUS
OUTPATIENT
Start: 2022-07-25

## 2022-07-18 RX ORDER — SODIUM CHLORIDE 9 MG/ML
5-250 INJECTION, SOLUTION INTRAVENOUS PRN
Status: DISCONTINUED | OUTPATIENT
Start: 2022-07-18 | End: 2022-07-19 | Stop reason: HOSPADM

## 2022-07-18 RX ORDER — EPINEPHRINE 1 MG/ML
0.3 INJECTION, SOLUTION, CONCENTRATE INTRAVENOUS PRN
OUTPATIENT
Start: 2022-07-25

## 2022-07-18 RX ORDER — SODIUM CHLORIDE 9 MG/ML
5-250 INJECTION, SOLUTION INTRAVENOUS PRN
OUTPATIENT
Start: 2022-07-25

## 2022-07-18 RX ORDER — ACETAMINOPHEN 325 MG/1
650 TABLET ORAL
OUTPATIENT
Start: 2022-07-25

## 2022-07-18 RX ORDER — ALBUTEROL SULFATE 90 UG/1
4 AEROSOL, METERED RESPIRATORY (INHALATION) PRN
OUTPATIENT
Start: 2022-07-25

## 2022-07-18 RX ORDER — DIPHENHYDRAMINE HYDROCHLORIDE 50 MG/ML
50 INJECTION INTRAMUSCULAR; INTRAVENOUS
OUTPATIENT
Start: 2022-07-25

## 2022-07-18 RX ORDER — HEPARIN SODIUM (PORCINE) LOCK FLUSH IV SOLN 100 UNIT/ML 100 UNIT/ML
500 SOLUTION INTRAVENOUS PRN
OUTPATIENT
Start: 2022-07-25

## 2022-07-18 RX ORDER — SODIUM CHLORIDE 9 MG/ML
INJECTION, SOLUTION INTRAVENOUS CONTINUOUS
OUTPATIENT
Start: 2022-07-25

## 2022-07-18 RX ORDER — SODIUM CHLORIDE 9 MG/ML
5-250 INJECTION, SOLUTION INTRAVENOUS PRN
Status: CANCELLED | OUTPATIENT
Start: 2022-07-25

## 2022-07-18 RX ORDER — SODIUM CHLORIDE 0.9 % (FLUSH) 0.9 %
5-40 SYRINGE (ML) INJECTION PRN
OUTPATIENT
Start: 2022-07-25

## 2022-07-18 RX ADMIN — FERRIC CARBOXYMALTOSE INJECTION 750 MG: 50 INJECTION, SOLUTION INTRAVENOUS at 14:12

## 2022-07-18 RX ADMIN — SODIUM CHLORIDE 25 ML/HR: 9 INJECTION, SOLUTION INTRAVENOUS at 13:45

## 2022-07-18 NOTE — PROGRESS NOTES
Arrived to the infusion center. Injectafer completed without signs of adverse reaction. No neew issues or complaints  ofered during the visit. Follow up will be labs in December. Baby due in August. Discharged ambulatoy in satisfactory condition.

## 2022-07-19 ENCOUNTER — ROUTINE PRENATAL (OUTPATIENT)
Dept: OBGYN CLINIC | Age: 27
End: 2022-07-19

## 2022-07-19 VITALS — SYSTOLIC BLOOD PRESSURE: 130 MMHG | BODY MASS INDEX: 22.14 KG/M2 | WEIGHT: 129 LBS | DIASTOLIC BLOOD PRESSURE: 70 MMHG

## 2022-07-19 DIAGNOSIS — Z34.83 PRENATAL CARE, SUBSEQUENT PREGNANCY, THIRD TRIMESTER: ICD-10-CM

## 2022-07-19 DIAGNOSIS — Z13.89 SCREENING FOR GENITOURINARY CONDITION: ICD-10-CM

## 2022-07-19 DIAGNOSIS — Z3A.36 36 WEEKS GESTATION OF PREGNANCY: Primary | ICD-10-CM

## 2022-07-19 DIAGNOSIS — Z36.85 ANTENATAL SCREENING FOR STREPTOCOCCUS B: ICD-10-CM

## 2022-07-19 LAB
GLUCOSE URINE, POC: NEGATIVE
PROTEIN,URINE, POC: NORMAL

## 2022-07-19 PROCEDURE — 99902 PR PRENATAL VISIT: CPT | Performed by: OBSTETRICS & GYNECOLOGY

## 2022-07-23 LAB
BACTERIA SPEC CULT: NORMAL
SERVICE CMNT-IMP: NORMAL

## 2022-07-24 ENCOUNTER — HOSPITAL ENCOUNTER (OUTPATIENT)
Age: 27
Discharge: HOME | End: 2022-07-24
Attending: OBSTETRICS & GYNECOLOGY | Admitting: OBSTETRICS & GYNECOLOGY
Payer: COMMERCIAL

## 2022-07-24 VITALS
SYSTOLIC BLOOD PRESSURE: 117 MMHG | HEART RATE: 87 BPM | DIASTOLIC BLOOD PRESSURE: 58 MMHG | RESPIRATION RATE: 18 BRPM | OXYGEN SATURATION: 99 % | TEMPERATURE: 98 F

## 2022-07-24 PROBLEM — O47.1 FALSE LABOR AFTER 37 COMPLETED WEEKS OF GESTATION: Status: ACTIVE | Noted: 2022-07-24

## 2022-07-24 PROCEDURE — 59025 FETAL NON-STRESS TEST: CPT

## 2022-07-24 PROCEDURE — 99283 EMERGENCY DEPT VISIT LOW MDM: CPT

## 2022-07-24 RX ORDER — ONDANSETRON 4 MG/1
4 TABLET, ORALLY DISINTEGRATING ORAL EVERY 8 HOURS PRN
Status: DISCONTINUED | OUTPATIENT
Start: 2022-07-24 | End: 2022-07-24 | Stop reason: HOSPADM

## 2022-07-24 RX ORDER — ONDANSETRON 2 MG/ML
4 INJECTION INTRAMUSCULAR; INTRAVENOUS EVERY 6 HOURS PRN
Status: DISCONTINUED | OUTPATIENT
Start: 2022-07-24 | End: 2022-07-24 | Stop reason: HOSPADM

## 2022-07-24 RX ORDER — ACETAMINOPHEN 325 MG/1
650 TABLET ORAL EVERY 4 HOURS PRN
Status: DISCONTINUED | OUTPATIENT
Start: 2022-07-24 | End: 2022-07-24 | Stop reason: HOSPADM

## 2022-07-24 NOTE — PROGRESS NOTES
Patient presents to triage with complaints of contractions on and off for a few day becoming more intense this afternoon with pressure. Patient reports good FM. Reports diarrhea x 2 days. US and [place] placed on soft, non-tender abdomen.

## 2022-07-25 NOTE — DISCHARGE INSTRUCTIONS
Week 40 of Your Pregnancy: Care Instructions  Overview     You are near the end of your pregnancy--and you're probably pretty uncomfortable. It may be harder to walk around. Lying down probably isn'tcomfortable either. You may have trouble getting to sleep or staying asleep. Most babies are born between 40 and 41 weeks. This is a good time to think about packing a bag for the hospital with items you'll need. Then you'll beready when labor starts. Follow-up care is a key part of your treatment and safety. Be sure to make and go to all appointments, and call your doctor if you are having problems. It's also a good idea to know your test results and keep alist of the medicines you take. How can you care for yourself at home? Learn about breastfeeding  Breastfeeding is best for your baby and good for you. Breast milk has antibodies to help your baby fight infections. If you breastfeed, you may lose weight faster. That's because making milk burns calories. Learning the best ways to hold your baby will make breastfeeding easier. Sometimes breastfeeding can make partners feel left out. If you have a partner, plan how you can care for your baby together. For example, your partner can bathe and diaper the baby. You can snuggle together when you breastfeed. You may want to learn how to use a breast pump and store your milk. If you choose to bottle feed, make the feeding feel like breastfeeding so you can bond with your baby. Always hold your baby and the bottle. Don't prop bottles or let your baby fall asleep with a bottle. Learn about crying  It's common for babies to cry for 1 to 3 hours a day. Some cry more, and some cry less. Babies don't cry to make you upset or because you're a bad parent. Crying is how your baby communicates. Your baby may be hungry; have gas; need a diaper change; or feel cold, warm, tired, lonely, or tense. Sometimes babies cry for unknown reasons.   If you respond to your baby's needs, your baby will learn to trust you. Try to stay calm when your baby cries. Your baby may get more upset if they sense that you are upset. Know how to care for your   Your baby's umbilical cord stump will drop off on its own, usually between 1 and 2 weeks. To care for your baby's umbilical cord area:  Clean the area at the bottom of the cord 2 or 3 times a day. Pay special attention to the area where the cord attaches to the skin. Keep the diaper folded below the cord. Use a damp washcloth or cotton ball to sponge bathe your baby until the stump has come off. Your baby's first dark stool is called meconium. After the meconium is passed, your baby will develop their own bowel pattern. Some babies, especially  babies, have several bowel movements a day. Others have one or two a day, or one every 2 to 3 days.  babies often have loose, yellow stools. Formula-fed babies have more formed stools. If your baby's stools look like little pellets, your baby is constipated. After 2 days of constipation, call your baby's doctor. If your baby will be circumcised, you can care for your baby at home. Gently rinse your baby's penis with warm water after every diaper change. Don't try to remove the film that forms on the penis. This film will go away on its own. Pat dry. Put petroleum ointment, such as Vaseline, on the area of the diaper that will touch your baby's penis. This will keep the diaper from sticking to your baby. Ask the doctor about giving your baby acetaminophen (Tylenol) for pain. Where can you learn more? Go to https://baudilio.[employer]. org and sign in to your [employer] account. Enter 68 [phone] [id number] in the [employer] box to learn more about \"Week 37 of Your Pregnancy: Care Instructions. \"     If you do not have an account, please click on the \"Sign Up Now\" link.   Current as of: 2022               Content Version: 13.3  © 8939-7800 Healthwise, Incorporated. Care instructions adapted under license by [facility] ([facility]). If you have questions about a medical condition or this instruction, always ask your healthcare professional. [address] any warranty or liability for your use of this information.

## 2022-07-25 NOTE — PROGRESS NOTES
Patient given discharge instructions at this time. Follow up with regular scheduled OB visit. Instructed to come back if having strong, regular contractions, bleeding more than spotting, DFM, and or LOF.

## 2022-07-25 NOTE — H&P
History & Physical    Name: Qi Wright MRN: [id number]  SSN: [id number]    YOB: 1995  Age: 31 y.o. Sex: female        Subjective: Ctxs  HPI: 32 yo  at 36+5wks presents c/o ctxs. She denies lof or vb. Reports normal FM. Pregnancy has been c/b prior CD with 2 subsequent VBACs, anemia, headaches, RH negativity, 2nd child with laryngomalacia. Prior pregnancies delivered at 40 wks and 39 wks x 2. Estimated Date of Delivery: 22  OB History    Para Term  AB Living   5 3 3   1 3   SAB IAB Ectopic Molar Multiple Live Births     1       3      # Outcome Date GA Lbr [place]/2nd Weight Sex Delivery Anes PTL Lv   5 Current            4 Term 19 40w0d 06:08 / 00:14 8 lb (3.63 kg) M  EPI N SHANNAN      Birth Comments: Had some tachypnea and mild retractions at birth. Mom had VBACHep B givenCirc performedPassed hearing and CCHD   3 Term 17 39w3d / 02:21 5 lb 15 oz (2.693 kg) F Vag-Spont EPI N SHANNAN   2 Term 13 39w0d  7 lb 8 oz (3.402 kg) F CS-LTranv Spinal N SHANNAN      Complications: Breech presentation   1 IAB         FD       Past Medical History:   Diagnosis Date    Abnormal Papanicolaou smear of cervix     had abnormal in last pregnancy. none since.     Chest pain during pregnancy     Maternal iron deficiency anemia affecting pregnancy in third trimester, antepartum 5/10/2022    Postpartum depression     no meds, second pregnancy    Postural dizziness with presyncope      Past Surgical History:   Procedure Laterality Date    APPENDECTOMY,W OTHR C       SECTION      CYST REMOVAL      HERNIA REPAIR      umbilical     Social History     Occupational History    Not on file   Tobacco Use    Smoking status: Never    Smokeless tobacco: Never   Substance and Sexual Activity    Alcohol use: No    Drug use: No    Sexual activity: Not on file     Family History   Problem Relation Age of Onset    Other Brother         pre-cancer polyps    Lung Cancer

## 2022-07-26 ENCOUNTER — ROUTINE PRENATAL (OUTPATIENT)
Dept: OBGYN CLINIC | Age: 27
End: 2022-07-26

## 2022-07-26 VITALS — DIASTOLIC BLOOD PRESSURE: 58 MMHG | SYSTOLIC BLOOD PRESSURE: 114 MMHG | BODY MASS INDEX: 21.8 KG/M2 | WEIGHT: 127 LBS

## 2022-07-26 DIAGNOSIS — Z34.83 PRENATAL CARE, SUBSEQUENT PREGNANCY, THIRD TRIMESTER: Primary | ICD-10-CM

## 2022-07-26 DIAGNOSIS — Z13.89 SCREENING FOR GENITOURINARY CONDITION: ICD-10-CM

## 2022-07-26 DIAGNOSIS — Z3A.37 37 WEEKS GESTATION OF PREGNANCY: ICD-10-CM

## 2022-07-26 LAB
GLUCOSE URINE, POC: NEGATIVE
PROTEIN,URINE, POC: NEGATIVE

## 2022-07-26 PROCEDURE — 99902 PR PRENATAL VISIT: CPT | Performed by: NURSE PRACTITIONER

## 2022-08-02 ENCOUNTER — ROUTINE PRENATAL (OUTPATIENT)
Dept: OBGYN CLINIC | Age: 27
End: 2022-08-02

## 2022-08-02 VITALS — BODY MASS INDEX: 21.97 KG/M2 | SYSTOLIC BLOOD PRESSURE: 102 MMHG | DIASTOLIC BLOOD PRESSURE: 60 MMHG | WEIGHT: 128 LBS

## 2022-08-02 DIAGNOSIS — Z34.83 PRENATAL CARE, SUBSEQUENT PREGNANCY, THIRD TRIMESTER: ICD-10-CM

## 2022-08-02 DIAGNOSIS — Z13.89 SCREENING FOR GENITOURINARY CONDITION: Primary | ICD-10-CM

## 2022-08-02 DIAGNOSIS — Z3A.38 38 WEEKS GESTATION OF PREGNANCY: ICD-10-CM

## 2022-08-02 LAB
GLUCOSE URINE, POC: NEGATIVE
PROTEIN,URINE, POC: NORMAL

## 2022-08-02 PROCEDURE — 99902 PR PRENATAL VISIT: CPT | Performed by: OBSTETRICS & GYNECOLOGY

## 2022-08-10 ENCOUNTER — ROUTINE PRENATAL (OUTPATIENT)
Dept: OBGYN CLINIC | Age: 27
End: 2022-08-10
Payer: COMMERCIAL

## 2022-08-10 VITALS — WEIGHT: 131 LBS | BODY MASS INDEX: 22.49 KG/M2 | DIASTOLIC BLOOD PRESSURE: 60 MMHG | SYSTOLIC BLOOD PRESSURE: 108 MMHG

## 2022-08-10 DIAGNOSIS — Z13.89 SCREENING FOR GENITOURINARY CONDITION: Primary | ICD-10-CM

## 2022-08-10 DIAGNOSIS — O26.843 UTERINE SIZE-DATE DISCREPANCY IN THIRD TRIMESTER: ICD-10-CM

## 2022-08-10 DIAGNOSIS — Z3A.39 39 WEEKS GESTATION OF PREGNANCY: ICD-10-CM

## 2022-08-10 DIAGNOSIS — Z34.83 PRENATAL CARE, SUBSEQUENT PREGNANCY, THIRD TRIMESTER: ICD-10-CM

## 2022-08-10 DIAGNOSIS — Z98.891 HISTORY OF C-SECTION: ICD-10-CM

## 2022-08-10 LAB
GLUCOSE URINE, POC: NEGATIVE
PROTEIN,URINE, POC: NEGATIVE

## 2022-08-10 PROCEDURE — 99902 PR PRENATAL VISIT: CPT | Performed by: OBSTETRICS & GYNECOLOGY

## 2022-08-10 PROCEDURE — 76816 OB US FOLLOW-UP PER FETUS: CPT | Performed by: OBSTETRICS & GYNECOLOGY

## 2022-08-10 PROCEDURE — 76819 FETAL BIOPHYS PROFIL W/O NST: CPT | Performed by: OBSTETRICS & GYNECOLOGY

## 2022-08-10 NOTE — PROGRESS NOTES
Kick counts and labor precautions. Wants to . Us today with efw 7#.  Pt would like to have her baby. Willing to bring in and break water soon.   Will be epidural.

## 2022-08-12 ENCOUNTER — HOSPITAL ENCOUNTER (INPATIENT)
Age: 27
LOS: 2 days | Discharge: HOME | End: 2022-08-14
Attending: OBSTETRICS & GYNECOLOGY | Admitting: OBSTETRICS & GYNECOLOGY
Payer: COMMERCIAL

## 2022-08-12 PROBLEM — Z37.9 NORMAL LABOR: Status: ACTIVE | Noted: 2022-08-12

## 2022-08-12 PROBLEM — Z3A.39 39 WEEKS GESTATION OF PREGNANCY: Status: ACTIVE | Noted: 2022-08-12

## 2022-08-12 PROCEDURE — 99283 EMERGENCY DEPT VISIT LOW MDM: CPT

## 2022-08-12 PROCEDURE — 85025 COMPLETE CBC W/AUTO DIFF WBC: CPT

## 2022-08-12 PROCEDURE — 86901 BLOOD TYPING SEROLOGIC RH(D): CPT

## 2022-08-12 PROCEDURE — 1100000000 HC RM PRIVATE

## 2022-08-12 RX ORDER — CARBOPROST TROMETHAMINE 250 UG/ML
250 INJECTION, SOLUTION INTRAMUSCULAR PRN
Status: CANCELLED | OUTPATIENT
Start: 2022-08-12

## 2022-08-12 RX ORDER — ONDANSETRON 2 MG/ML
4 INJECTION INTRAMUSCULAR; INTRAVENOUS EVERY 6 HOURS PRN
Status: DISCONTINUED | OUTPATIENT
Start: 2022-08-12 | End: 2022-08-12

## 2022-08-12 RX ORDER — SODIUM CHLORIDE 9 MG/ML
25 INJECTION, SOLUTION INTRAVENOUS PRN
Status: DISCONTINUED | OUTPATIENT
Start: 2022-08-12 | End: 2022-08-13 | Stop reason: HOSPADM

## 2022-08-12 RX ORDER — CARBOPROST TROMETHAMINE 250 UG/ML
250 INJECTION, SOLUTION INTRAMUSCULAR PRN
Status: DISCONTINUED | OUTPATIENT
Start: 2022-08-12 | End: 2022-08-13

## 2022-08-12 RX ORDER — SODIUM CHLORIDE 0.9 % (FLUSH) 0.9 %
5-40 SYRINGE (ML) INJECTION PRN
Status: CANCELLED | OUTPATIENT
Start: 2022-08-12

## 2022-08-12 RX ORDER — METHYLERGONOVINE MALEATE 0.2 MG/ML
200 INJECTION INTRAVENOUS PRN
Status: CANCELLED | OUTPATIENT
Start: 2022-08-12

## 2022-08-12 RX ORDER — TERBUTALINE SULFATE 1 MG/ML
0.25 INJECTION, SOLUTION SUBCUTANEOUS ONCE
Status: DISCONTINUED | OUTPATIENT
Start: 2022-08-13 | End: 2022-08-13

## 2022-08-12 RX ORDER — SODIUM CHLORIDE 0.9 % (FLUSH) 0.9 %
5-40 SYRINGE (ML) INJECTION EVERY 12 HOURS SCHEDULED
Status: CANCELLED | OUTPATIENT
Start: 2022-08-12

## 2022-08-12 RX ORDER — SODIUM CHLORIDE, SODIUM LACTATE, POTASSIUM CHLORIDE, AND CALCIUM CHLORIDE .6; .31; .03; .02 G/100ML; G/100ML; G/100ML; G/100ML
500 INJECTION, SOLUTION INTRAVENOUS PRN
Status: CANCELLED | OUTPATIENT
Start: 2022-08-12

## 2022-08-12 RX ORDER — SODIUM CHLORIDE, SODIUM LACTATE, POTASSIUM CHLORIDE, AND CALCIUM CHLORIDE .6; .31; .03; .02 G/100ML; G/100ML; G/100ML; G/100ML
1000 INJECTION, SOLUTION INTRAVENOUS PRN
Status: CANCELLED | OUTPATIENT
Start: 2022-08-12

## 2022-08-12 RX ORDER — SODIUM CHLORIDE, SODIUM LACTATE, POTASSIUM CHLORIDE, AND CALCIUM CHLORIDE .6; .31; .03; .02 G/100ML; G/100ML; G/100ML; G/100ML
500 INJECTION, SOLUTION INTRAVENOUS PRN
Status: DISCONTINUED | OUTPATIENT
Start: 2022-08-12 | End: 2022-08-12

## 2022-08-12 RX ORDER — SODIUM CHLORIDE 9 MG/ML
25 INJECTION, SOLUTION INTRAVENOUS PRN
Status: CANCELLED | OUTPATIENT
Start: 2022-08-12

## 2022-08-12 RX ORDER — SODIUM CHLORIDE, SODIUM LACTATE, POTASSIUM CHLORIDE, CALCIUM CHLORIDE 600; 310; 30; 20 MG/100ML; MG/100ML; MG/100ML; MG/100ML
INJECTION, SOLUTION INTRAVENOUS CONTINUOUS
Status: CANCELLED | OUTPATIENT
Start: 2022-08-12

## 2022-08-12 RX ORDER — TRANEXAMIC ACID 10 MG/ML
1000 INJECTION, SOLUTION INTRAVENOUS
Status: ACTIVE | OUTPATIENT
Start: 2022-08-12 | End: 2022-08-12

## 2022-08-12 RX ORDER — SODIUM CHLORIDE 9 MG/ML
25 INJECTION, SOLUTION INTRAVENOUS PRN
Status: DISCONTINUED | OUTPATIENT
Start: 2022-08-12 | End: 2022-08-13

## 2022-08-12 RX ORDER — METHYLERGONOVINE MALEATE 0.2 MG/ML
200 INJECTION INTRAVENOUS PRN
Status: DISCONTINUED | OUTPATIENT
Start: 2022-08-12 | End: 2022-08-13

## 2022-08-12 RX ORDER — SODIUM CHLORIDE 0.9 % (FLUSH) 0.9 %
5-40 SYRINGE (ML) INJECTION PRN
Status: DISCONTINUED | OUTPATIENT
Start: 2022-08-12 | End: 2022-08-12

## 2022-08-12 RX ORDER — ONDANSETRON 2 MG/ML
4 INJECTION INTRAMUSCULAR; INTRAVENOUS EVERY 6 HOURS PRN
Status: CANCELLED | OUTPATIENT
Start: 2022-08-12

## 2022-08-12 RX ORDER — ACETAMINOPHEN 325 MG/1
650 TABLET ORAL EVERY 4 HOURS PRN
Status: DISCONTINUED | OUTPATIENT
Start: 2022-08-12 | End: 2022-08-13 | Stop reason: HOSPADM

## 2022-08-12 RX ORDER — SODIUM CHLORIDE, SODIUM LACTATE, POTASSIUM CHLORIDE, CALCIUM CHLORIDE 600; 310; 30; 20 MG/100ML; MG/100ML; MG/100ML; MG/100ML
INJECTION, SOLUTION INTRAVENOUS CONTINUOUS
Status: DISCONTINUED | OUTPATIENT
Start: 2022-08-13 | End: 2022-08-13 | Stop reason: HOSPADM

## 2022-08-12 RX ORDER — TRANEXAMIC ACID 10 MG/ML
1000 INJECTION, SOLUTION INTRAVENOUS
Status: CANCELLED | OUTPATIENT
Start: 2022-08-12 | End: 2022-08-12

## 2022-08-12 RX ORDER — SODIUM CHLORIDE 0.9 % (FLUSH) 0.9 %
5-40 SYRINGE (ML) INJECTION EVERY 12 HOURS SCHEDULED
Status: DISCONTINUED | OUTPATIENT
Start: 2022-08-13 | End: 2022-08-13

## 2022-08-12 RX ORDER — TERBUTALINE SULFATE 1 MG/ML
0.25 INJECTION, SOLUTION SUBCUTANEOUS ONCE
Status: CANCELLED | OUTPATIENT
Start: 2022-08-12 | End: 2022-08-12

## 2022-08-12 RX ORDER — TRANEXAMIC ACID 10 MG/ML
1000 INJECTION, SOLUTION INTRAVENOUS
Status: DISCONTINUED | OUTPATIENT
Start: 2022-08-12 | End: 2022-08-12

## 2022-08-12 RX ORDER — SODIUM CHLORIDE 0.9 % (FLUSH) 0.9 %
5-40 SYRINGE (ML) INJECTION EVERY 12 HOURS SCHEDULED
Status: DISCONTINUED | OUTPATIENT
Start: 2022-08-13 | End: 2022-08-13 | Stop reason: HOSPADM

## 2022-08-12 RX ORDER — SODIUM CHLORIDE, SODIUM LACTATE, POTASSIUM CHLORIDE, AND CALCIUM CHLORIDE .6; .31; .03; .02 G/100ML; G/100ML; G/100ML; G/100ML
500 INJECTION, SOLUTION INTRAVENOUS PRN
Status: DISCONTINUED | OUTPATIENT
Start: 2022-08-12 | End: 2022-08-13 | Stop reason: HOSPADM

## 2022-08-12 RX ORDER — SODIUM CHLORIDE, SODIUM LACTATE, POTASSIUM CHLORIDE, AND CALCIUM CHLORIDE .6; .31; .03; .02 G/100ML; G/100ML; G/100ML; G/100ML
1000 INJECTION, SOLUTION INTRAVENOUS PRN
Status: DISCONTINUED | OUTPATIENT
Start: 2022-08-12 | End: 2022-08-13 | Stop reason: HOSPADM

## 2022-08-12 RX ORDER — SODIUM CHLORIDE, SODIUM LACTATE, POTASSIUM CHLORIDE, CALCIUM CHLORIDE 600; 310; 30; 20 MG/100ML; MG/100ML; MG/100ML; MG/100ML
INJECTION, SOLUTION INTRAVENOUS CONTINUOUS
Status: DISCONTINUED | OUTPATIENT
Start: 2022-08-13 | End: 2022-08-12

## 2022-08-12 RX ORDER — SODIUM CHLORIDE, SODIUM LACTATE, POTASSIUM CHLORIDE, AND CALCIUM CHLORIDE .6; .31; .03; .02 G/100ML; G/100ML; G/100ML; G/100ML
1000 INJECTION, SOLUTION INTRAVENOUS PRN
Status: DISCONTINUED | OUTPATIENT
Start: 2022-08-12 | End: 2022-08-13

## 2022-08-12 RX ORDER — MISOPROSTOL 200 UG/1
800 TABLET ORAL PRN
Status: DISCONTINUED | OUTPATIENT
Start: 2022-08-12 | End: 2022-08-13

## 2022-08-12 RX ORDER — ONDANSETRON 2 MG/ML
4 INJECTION INTRAMUSCULAR; INTRAVENOUS EVERY 6 HOURS PRN
Status: DISCONTINUED | OUTPATIENT
Start: 2022-08-12 | End: 2022-08-13 | Stop reason: HOSPADM

## 2022-08-12 RX ORDER — SODIUM CHLORIDE 0.9 % (FLUSH) 0.9 %
5-40 SYRINGE (ML) INJECTION PRN
Status: DISCONTINUED | OUTPATIENT
Start: 2022-08-12 | End: 2022-08-13 | Stop reason: HOSPADM

## 2022-08-12 RX ORDER — MISOPROSTOL 200 UG/1
800 TABLET ORAL PRN
Status: CANCELLED | OUTPATIENT
Start: 2022-08-12

## 2022-08-13 ENCOUNTER — ANESTHESIA (OUTPATIENT)
Dept: LABOR AND DELIVERY | Age: 27
End: 2022-08-13
Payer: COMMERCIAL

## 2022-08-13 ENCOUNTER — ANESTHESIA EVENT (OUTPATIENT)
Dept: LABOR AND DELIVERY | Age: 27
End: 2022-08-13
Payer: COMMERCIAL

## 2022-08-13 LAB
ABO + RH BLD: NORMAL
BASE DEFICIT BLD-SCNC: 2.9 MMOL/L
BASE DEFICIT BLD-SCNC: 4.8 MMOL/L
BASOPHILS # BLD: 0 K/UL (ref 0–0.2)
BASOPHILS NFR BLD: 0 % (ref 0–2)
BLOOD GROUP ANTIBODIES SERPL: NORMAL
DIFFERENTIAL METHOD BLD: ABNORMAL
EOSINOPHIL # BLD: 0 K/UL (ref 0–0.8)
EOSINOPHIL NFR BLD: 0 % (ref 0.5–7.8)
HCO3 BLD-SCNC: 23.7 MMOL/L (ref 22–26)
HCO3 BLDV-SCNC: 22.9 MMOL/L (ref 23–28)
HCT VFR BLD AUTO: 37.4 % (ref 35.8–46.3)
HGB BLD-MCNC: 11.8 G/DL (ref 11.7–15.4)
IMM GRANULOCYTES # BLD AUTO: 0.2 K/UL (ref 0–0.5)
IMM GRANULOCYTES NFR BLD AUTO: 1 % (ref 0–5)
LYMPHOCYTES # BLD: 2 K/UL (ref 0.5–4.6)
LYMPHOCYTES NFR BLD: 13 % (ref 13–44)
MCH RBC QN AUTO: 24.5 PG (ref 26.1–32.9)
MCHC RBC AUTO-ENTMCNC: 31.6 G/DL (ref 31.4–35)
MCV RBC AUTO: 77.6 FL (ref 79.6–97.8)
MONOCYTES # BLD: 0.9 K/UL (ref 0.1–1.3)
MONOCYTES NFR BLD: 6 % (ref 4–12)
NEUTS SEG # BLD: 12.3 K/UL (ref 1.7–8.2)
NEUTS SEG NFR BLD: 80 % (ref 43–78)
NRBC # BLD: 0 K/UL (ref 0–0.2)
PCO2 BLDCO: 42 MMHG (ref 32–68)
PCO2 BLDCO: 55 MMHG (ref 32–68)
PH BLDCO: 7.24 [PH] (ref 7.15–7.38)
PH BLDCO: 7.34 [PH] (ref 7.15–7.38)
PLATELET # BLD AUTO: 214 K/UL (ref 150–450)
PLATELET COMMENT: ADEQUATE
PMV BLD AUTO: 10.2 FL (ref 9.4–12.3)
PO2 BLDCO: 27 MMHG
PO2 BLDCO: 29 MMHG
RBC # BLD AUTO: 4.82 M/UL (ref 4.05–5.2)
RBC MORPH BLD: ABNORMAL
SAO2 % BLD: 40.1 % (ref 95–98)
SAO2 % BLDV: 50.7 % (ref 65–88)
SERVICE CMNT-IMP: ABNORMAL
SERVICE CMNT-IMP: ABNORMAL
SPECIMEN EXP DATE BLD: NORMAL
SPECIMEN TYPE: ABNORMAL
SPECIMEN TYPE: ABNORMAL
WBC # BLD AUTO: 15.4 K/UL (ref 4.3–11.1)
WBC MORPH BLD: ABNORMAL

## 2022-08-13 PROCEDURE — 7210000100 HC LABOR FEE PER 1 HR

## 2022-08-13 PROCEDURE — 6370000000 HC RX 637 (ALT 250 FOR IP): Performed by: OBSTETRICS & GYNECOLOGY

## 2022-08-13 PROCEDURE — 0UQMXZZ REPAIR VULVA, EXTERNAL APPROACH: ICD-10-PCS | Performed by: OBSTETRICS & GYNECOLOGY

## 2022-08-13 PROCEDURE — 62325 NJX INTERLAMINAR CRV/THRC: CPT | Performed by: ANESTHESIOLOGY

## 2022-08-13 PROCEDURE — 82803 BLOOD GASES ANY COMBINATION: CPT

## 2022-08-13 PROCEDURE — 36600 WITHDRAWAL OF ARTERIAL BLOOD: CPT

## 2022-08-13 PROCEDURE — 7220000101 HC DELIVERY VAGINAL/SINGLE

## 2022-08-13 PROCEDURE — 6360000002 HC RX W HCPCS: Performed by: NURSE ANESTHETIST, CERTIFIED REGISTERED

## 2022-08-13 PROCEDURE — 1100000000 HC RM PRIVATE

## 2022-08-13 PROCEDURE — 3E0R3BZ INTRODUCTION OF ANESTHETIC AGENT INTO SPINAL CANAL, PERCUTANEOUS APPROACH: ICD-10-PCS | Performed by: ANESTHESIOLOGY

## 2022-08-13 PROCEDURE — 6360000002 HC RX W HCPCS: Performed by: OBSTETRICS & GYNECOLOGY

## 2022-08-13 PROCEDURE — 7100000010 HC PHASE II RECOVERY - FIRST 15 MIN

## 2022-08-13 PROCEDURE — 2500000003 HC RX 250 WO HCPCS: Performed by: NURSE ANESTHETIST, CERTIFIED REGISTERED

## 2022-08-13 PROCEDURE — 7100000011 HC PHASE II RECOVERY - ADDTL 15 MIN

## 2022-08-13 RX ORDER — DOCUSATE SODIUM 100 MG/1
100 CAPSULE, LIQUID FILLED ORAL 2 TIMES DAILY
Status: DISCONTINUED | OUTPATIENT
Start: 2022-08-13 | End: 2022-08-14 | Stop reason: HOSPADM

## 2022-08-13 RX ORDER — ONDANSETRON 8 MG/1
8 TABLET, ORALLY DISINTEGRATING ORAL EVERY 8 HOURS PRN
Status: DISCONTINUED | OUTPATIENT
Start: 2022-08-13 | End: 2022-08-14 | Stop reason: HOSPADM

## 2022-08-13 RX ORDER — ACETAMINOPHEN 500 MG
1000 TABLET ORAL EVERY 8 HOURS PRN
Status: DISCONTINUED | OUTPATIENT
Start: 2022-08-13 | End: 2022-08-14 | Stop reason: HOSPADM

## 2022-08-13 RX ORDER — IBUPROFEN 800 MG/1
800 TABLET ORAL EVERY 8 HOURS
Status: DISCONTINUED | OUTPATIENT
Start: 2022-08-13 | End: 2022-08-14 | Stop reason: HOSPADM

## 2022-08-13 RX ORDER — MISOPROSTOL 200 UG/1
200 TABLET ORAL
Status: ACTIVE | OUTPATIENT
Start: 2022-08-13 | End: 2022-08-13

## 2022-08-13 RX ORDER — DIPHENHYDRAMINE HYDROCHLORIDE 50 MG/ML
12.5 INJECTION INTRAMUSCULAR; INTRAVENOUS ONCE
Status: COMPLETED | OUTPATIENT
Start: 2022-08-13 | End: 2022-08-13

## 2022-08-13 RX ORDER — DIPHENHYDRAMINE HCL 25 MG
25 CAPSULE ORAL EVERY 6 HOURS PRN
Status: DISCONTINUED | OUTPATIENT
Start: 2022-08-13 | End: 2022-08-14 | Stop reason: HOSPADM

## 2022-08-13 RX ORDER — FENTANYL CITRATE 50 UG/ML
INJECTION, SOLUTION INTRAMUSCULAR; INTRAVENOUS PRN
Status: DISCONTINUED | OUTPATIENT
Start: 2022-08-13 | End: 2022-08-13 | Stop reason: SDUPTHER

## 2022-08-13 RX ORDER — ROPIVACAINE HYDROCHLORIDE 2 MG/ML
INJECTION, SOLUTION EPIDURAL; INFILTRATION; PERINEURAL PRN
Status: DISCONTINUED | OUTPATIENT
Start: 2022-08-13 | End: 2022-08-13 | Stop reason: SDUPTHER

## 2022-08-13 RX ORDER — SODIUM CHLORIDE, SODIUM LACTATE, POTASSIUM CHLORIDE, CALCIUM CHLORIDE 600; 310; 30; 20 MG/100ML; MG/100ML; MG/100ML; MG/100ML
INJECTION, SOLUTION INTRAVENOUS CONTINUOUS
Status: DISCONTINUED | OUTPATIENT
Start: 2022-08-13 | End: 2022-08-13

## 2022-08-13 RX ORDER — FENTANYL CITRATE 50 UG/ML
INJECTION, SOLUTION INTRAMUSCULAR; INTRAVENOUS
Status: COMPLETED
Start: 2022-08-13 | End: 2022-08-13

## 2022-08-13 RX ORDER — OXYCODONE HYDROCHLORIDE 5 MG/1
5 TABLET ORAL EVERY 4 HOURS PRN
Status: DISCONTINUED | OUTPATIENT
Start: 2022-08-13 | End: 2022-08-14 | Stop reason: HOSPADM

## 2022-08-13 RX ORDER — METHYLERGONOVINE MALEATE 0.2 MG/ML
200 INJECTION INTRAVENOUS PRN
Status: DISCONTINUED | OUTPATIENT
Start: 2022-08-13 | End: 2022-08-13 | Stop reason: SDUPTHER

## 2022-08-13 RX ORDER — METHYLERGONOVINE MALEATE 0.2 MG/ML
200 INJECTION INTRAVENOUS
Status: ACTIVE | OUTPATIENT
Start: 2022-08-13 | End: 2022-08-13

## 2022-08-13 RX ORDER — LANOLIN
CREAM (ML) TOPICAL PRN
Status: DISCONTINUED | OUTPATIENT
Start: 2022-08-13 | End: 2022-08-14 | Stop reason: HOSPADM

## 2022-08-13 RX ORDER — EPHEDRINE SULFATE/0.9% NACL/PF 50 MG/5 ML
SYRINGE (ML) INTRAVENOUS PRN
Status: DISCONTINUED | OUTPATIENT
Start: 2022-08-13 | End: 2022-08-13 | Stop reason: SDUPTHER

## 2022-08-13 RX ADMIN — IBUPROFEN 800 MG: 800 TABLET, FILM COATED ORAL at 08:46

## 2022-08-13 RX ADMIN — FENTANYL CITRATE 100 MCG: 50 INJECTION INTRAMUSCULAR; INTRAVENOUS at 02:55

## 2022-08-13 RX ADMIN — DIPHENHYDRAMINE HYDROCHLORIDE 12.5 MG: 50 INJECTION, SOLUTION INTRAMUSCULAR; INTRAVENOUS at 05:33

## 2022-08-13 RX ADMIN — Medication 15 MG: at 03:12

## 2022-08-13 RX ADMIN — ROPIVACAINE HYDROCHLORIDE 8 ML/HR: 2 INJECTION, SOLUTION EPIDURAL; INFILTRATION; PERINEURAL at 02:52

## 2022-08-13 RX ADMIN — ROPIVACAINE HYDROCHLORIDE 5 ML: 2 INJECTION, SOLUTION EPIDURAL; INFILTRATION; PERINEURAL at 02:48

## 2022-08-13 RX ADMIN — Medication: at 08:46

## 2022-08-13 RX ADMIN — ROPIVACAINE HYDROCHLORIDE 5 ML: 2 INJECTION, SOLUTION EPIDURAL; INFILTRATION; PERINEURAL at 02:47

## 2022-08-13 NOTE — PROGRESS NOTES
3915- SVE C/C/+1 with trial push with good fetal descent. Dr Pato Shelton at bedside and request to setup for delivery. Pt placed up in stirrups and prepped for delivery. 26- SV of viable male with spontaneous cry at delivery. Placed on mom's check for delayed cord clamping.   0429- Spontaneous delivery of placenta. Pitocin infusing per orders. Repair in progress. 0431- Apgars 9/9. 7lb, 6oz., 20.5 in. VSS. Placed skin to skin with mom.

## 2022-08-13 NOTE — PROGRESS NOTES
Pt up to void with success, clean ice pack, deborah pad and underwear in place. Pt tolerated well. Transferred to MIU via w/c.

## 2022-08-13 NOTE — PROGRESS NOTES
Labor Progress Note  Patient seen, fetal heart rate and contraction pattern evaluated, patient examined. Called by rn with prolonged decel. She is s/p epidural.    Patient Vitals for the past 1 hrs:   BP Pulse   08/13/22 0258 (!) 100/52 100   08/13/22 0257 (!) 101/55 (!) 103   08/13/22 0256 (!) 106/55 90   08/13/22 0255 (!) 106/52 95   08/13/22 0254 (!) 104/52 92   08/13/22 0253 (!) 113/58 97   08/13/22 0251 (!) 111/54 93   08/13/22 0250 116/64 99   08/13/22 0249 (!) 115/57 (!) 108   08/13/22 0248 (!) 112/56 98   08/13/22 0247 (!) 122/58 99   08/13/22 0246 (!) 112/57 96       Physical Exam:  Cervical Exam:  9 cm dilated    90% effaced    -2 station    Presenting Part: cephalic  Membranes:  Artificial Rupture of Membranes; Amniotic Fluid: small amount of clear fluid  Uterine Activity: Frequency: Every 1-2 minutes  Fetal Heart Rate: Baseline: 150's with mod variability with 8 min decel to 80's.   Upon my arrival to room climbing back to 150's     Assessment/Plan:  Reassuring fetal status, Continue plan for vaginal delivery

## 2022-08-13 NOTE — LACTATION NOTE

## 2022-08-13 NOTE — H&P
effacement, -2 station  Uterine activity/Contractions on monitor: Regular  Presentation: cephalic    Labs: Review & Summary  Prenatal:  Lab Results   Component Value Date/Time    HGB 9.2 (L) 2022 01:22 PM    HCT 32.3 (L) 2022 01:22 PM     GBS negative      Assessment:  39w3d gestation  Regular contractions with severe discomfort ; Very favorable cervix. Obstetrical history significant for  prior  x1 followed by 2 successful VBACs  . Induction of labor previously planned for the morning.     Plan: admit for labor    Discussed with: Dr. Beckham Exon

## 2022-08-13 NOTE — PROGRESS NOTES
Pt presents to [facility] triage with c/o worsening contractions since  today. Reports small amount of dark bleeding. States that they stripped her membranes when she was seen in the office this week. Denies leakage of fluid, H/A, of any other complaints. Reports good FM. EFM and [place] applied, soft, non-tender abdomen. Dr. Frankey Lipoma at bedside, strip reviewed. SVE /-2 with bulging bag. Pt a  and is scheduled to come in for induction in the morning. Orders to admit for labor.

## 2022-08-13 NOTE — L&D DELIVERY NOTE
Mother's Information      Labor Events     Labor?: No  Cervical Ripening:   Now               Mary Marquis [[id number]]      Labor Events     Labor?: No   Steroids?: None  Cervical Ripening Date/Time:     Antibiotics Received during Labor?: No  Rupture Identifier: Sac 1   Rupture Date/Time: 22 03:15:05   Rupture Type: Intact, AROM  Fluid Color: Clear  Fluid Odor: None  Fluid Volume:  Moderate  Induction: None  Augmentation: None  Labor Complications: None       Anesthesia    Method: Epidural       Start Pushing      Labor onset date/time:   Now     Dilation complete date/time:   Now     Start pushing date/time:    Decision date/time (emergent ):           Labor Length    3rd stage: 0h 03m       Delivery ([place])      Delivery Date/Time:  22 04:26:00   Delivery Type: , Spontaneous    Details:             Presentation    Presentation: Vertex  Position: Middle  _: Occiput  _: Anterior       Shoulder Dystocia    Shoulder Dystocia Present?: No  Add Second Maneuver  Add Third Maneuver  Add Fourth Maneuver  Add Fifth Maneuver  Add Sixth Maneuver  Add Seventh Maneuver  Add Eighth Maneuver  Add Ninth Maneuver       Assisted Delivery Details    Forceps Attempted?: No  Vacuum Extractor Attempted?: No       Document Additional Attempt         Document Additional Attempt                 Cord    Vessels: 3 Vessels  Complications: Nuchal Loose, Knot  Cord Around: Shoulders  Delayed Cord Clamping?: Yes  Cord Blood Disposition: Lab  Gases Sent?: Yes       Placenta    Date/Time: 2022 04:29:00  Removal: Spontaneous  Appearance: Intact  Disposition: Discarded       Lacerations    Episiotomy: None  Perineal Lacerations: None  Other Lacerations: labial laceration  Labial Laceration: left Repaired?: Yes   Number of Repair Packets: 1       Vaginal Counts    Initial Count Personnel: PRIYANK FAN    Sponges Needles Instruments   Initial Counts Correct  Correct Final Counts Correct  Correct   If the count is incorrect due to Intentionally Retained Foreign Object (IRFO) add the IRFO LDA in Lines/Drains. Add LDA: Link to [facility]       Blood Loss  Mother: Etta Lennon \"Drew\" #[id number]     Start of Mother's Information      Delivery Blood Loss  22 1626 - 22 0444      None                 End of Mother's Information  Mother: Etta Lennon \"Drew\" #[id number]                Delivery Providers    Delivering clinician: Hunter Light DO     Provider Role    Hunter Light,  Obstetrician    Lucho Jackson, RN Primary Nurse    Jocy Silva RN Primary  Nurse    Lisa Osier Scrub Tech               Assessment    Living Status: Living  Delivery Location Comment: 432     Apgar Scoring Key:    0 1 2    Skin Color: Blue or pale Acrocyanotic Completely pink    Heart Rate: Absent <100 bpm >100 bpm    Reflex Irritability: No response Grimace Cry or active withdrawal    Muscle Tone: Limp Some flexion Active motion    Respiratory Effort: Absent Weak cry; hypoventilation Good, crying                      Skin Color:   Heart Rate:   Reflex Irritability:   Muscle Tone:   Respiratory Effort: Total:            1 Minute:    1    2    2    2    2    9        Apgar 1 total from OB History    5 Minute:    1    2    2    2    2    9        Apgar 5 total from OB History    10 Minute:              15 Minute:              20 Minute:                        Apgars Assigned By: Ronerik Or              Resuscitation    Method: Bulb Suction, Stimulation              Measurements      Birth Weight: 3340 g Birth Length: 0.52 m     Head Circumference: 0.345 m Chest Circumference: 0.33 m              Title      Skin to Skin Initiation Date/Time:       Skin to Skin End Date/Time:                  Head delivered over intact perineum with delivery of anterior shoulder. Bulb suction of mouth and nose on field.   Posterior shoulder delivered and nuchal cord reduced x 1. Baby placed upon maternal chest with cord clamped and cut. Placenta expressed with fundus firm and manually explored and noted to be free of placenta. Left labial laceration repaired using 3-0 vicryl with excellent hemostasis. External perineal body abrasion reapproximated with 3-0 vicryl with interrupted suture x 2. Mother and baby doing well.

## 2022-08-13 NOTE — LACTATION NOTE
This note was copied from a baby's chart. Mom has a well everted nipple on R side and a deeply folded nipple inverted on L. Assisted with breastfeeding in football on R.  Baby fed well. Demonstrated manual lip flange. Mom reports baby will not latch on L. Mom tried with a nipple shield earlier. Discussed appropriate timing of nipple shield use and mom agreeable to pump. Mom tried with her manual pump, but stated it was uncomfortable. Nipple everts well in pump, but immediately retracts. States Symphony pump was ok to use, but got sore at the end. Got a few drops. Suggest watching nipple fold for soreness and turn down pump suction as needed. Encouraged frequent feeding and watch output.

## 2022-08-13 NOTE — ANESTHESIA PROCEDURE NOTES
Epidural Block    Patient location during procedure: OB  Start time: 8/13/2022 2:41 AM  End time: 8/13/2022 2:47 AM  Reason for block: labor epidural and at surgeon's request  Staffing  Performed: anesthesiologist   Anesthesiologist: Jasmeet Main MD  Epidural  Patient position: sitting  Prep: ChloraPrep  Patient monitoring: frequent blood pressure checks  Approach: midline  Location: L3-4  Injection technique: DAVID saline  Provider prep: mask and sterile gloves  Needle  Needle type: Tuohy   Needle gauge: 17 G  Needle insertion depth: 3.5 cm  Catheter type: multi-orifice  Catheter size: 19 G  Catheter at skin depth: 7 cm  Test dose: negativeCatheter Secured: tegaderm and tape  Assessment  Hemodynamics: stable  Attempts: 1  Outcomes: patient tolerated procedure well and uncomplicated  Preanesthetic Checklist  Completed: patient identified, IV checked, site marked, risks and benefits discussed, surgical/procedural consents, equipment checked, pre-op evaluation, timeout performed, anesthesia consent given, oxygen available and monitors applied/VS acknowledged

## 2022-08-13 NOTE — ANESTHESIA PRE PROCEDURE
Department of Anesthesiology  Preprocedure Note       Name:  Ishan Ware   Age:  31 y.o.  :  1995                                          MRN:  [id number]         Date:  2022      Surgeon: * No surgeons listed *    Procedure: * No procedures listed *    Medications prior to admission:   Prior to Admission medications    Medication Sig Start Date End Date Taking?  Authorizing Provider   ferrous sulfate (IRON 325) 325 (65 Fe) MG tablet Take 325 mg by mouth 19   Historical Provider, MD   Prenatal Vit-Fe Fumarate-FA (PRENATAL VITAMINS PO) Take by mouth    Historical Provider, MD       Current medications:    Current Facility-Administered Medications   Medication Dose Route Frequency Provider Last Rate Last Admin    lactated ringers infusion   IntraVENous Continuous Gely Wilson MD        lactated ringers bolus  500 mL IntraVENous PRN Gely Wilson MD        Or    lactated ringers bolus  1,000 mL IntraVENous PRN Gely Wilson MD        sodium chloride flush 0.9 % injection 5-40 mL  5-40 mL IntraVENous 2 times per day Gely Wilson MD        sodium chloride flush 0.9 % injection 5-40 mL  5-40 mL IntraVENous PRN Gely Wilson MD        0.9 % sodium chloride infusion  25 mL IntraVENous PRN Gely Wilson MD        butorphanol (STADOL) injection 1 mg  1 mg IntraVENous Q3H PRN Gely Wilson MD        ondansetron (ZOFRAN) injection 4 mg  4 mg IntraVENous Q6H PRN Gely Wilson MD        acetaminophen (TYLENOL) tablet 650 mg  650 mg Oral Q4H PRN Gely Wilson MD        benzocaine-menthol (DERMOPLAST) 20-0.5 % spray   Topical PRN Gely Wilson MD        lactated ringers bolus  1,000 mL IntraVENous PRN Kaci K Alt, DO        sodium chloride flush 0.9 % injection 5-40 mL  5-40 mL IntraVENous 2 times per day Kaci K Alt, DO        0.9 % sodium chloride infusion  25 mL IntraVENous PRN Kaci K Alt, DO        methylergonovine (METHERGINE) injection 200 mcg  200 mcg IntraMUSCular PRN Kaci DE LEON Alt, DO        carboprost (HEMABATE) injection 250 mcg  250 mcg IntraMUSCular PRN Kaci K Alt, DO        miSOPROStol (CYTOTEC) tablet 800 mcg  800 mcg Rectal PRN Kaci K Alt, DO        oxytocin (PITOCIN) 30 units in 500 mL infusion  87.3 logan-units/min IntraVENous Continuous PRN Kaci K Alt, DO        And    oxytocin (PITOCIN) 10 unit bolus from the bag  10 Units IntraVENous PRN Kaci K Alt, DO        terbutaline (BRETHINE) injection 0.25 mg  0.25 mg SubCUTAneous Once Kaci K Alt, DO         Facility-Administered Medications Ordered in Other Encounters   Medication Dose Route Frequency Provider Last Rate Last Admin    ropivacaine (NAROPIN) 0.2% injection 0.2%   Epidural PRN AFSHAN Felix - CRNA   5 mL at 22 0248       Allergies: Allergies   Allergen Reactions    Hydromorphone Itching    Morphine Itching     Extreme itching    Adhesive Tape Other (See Comments)     Severe skin irritation; skin comes off with tape         Problem List:    Patient Active Problem List   Diagnosis Code    Hx successful  (vaginal birth after ), currently pregnant O31.200    Laryngomalacia Q31.5    Rh negative status during pregnancy O26.899, Z67.91    Abnormal Pap smear of cervix R87.619    History of  Z98.891    [facility] E75.23    Pregnancy Z34.90    Headache in pregnancy, antepartum, third trimester O26.893, R51.9    Postural dizziness with presyncope R42, R55    Chest pain during pregnancy O99.891, R07.9    Maternal iron deficiency anemia affecting pregnancy in third trimester, antepartum O99.013, D50.9    LEXIS (iron deficiency anemia) D50.9    False labor after 37 completed weeks of gestation O48.3    Normal labor O80, Z37.9    39 weeks gestation of pregnancy Z3A.39       Past Medical History:        Diagnosis Date    Abnormal Papanicolaou smear of cervix     had abnormal in last pregnancy. none since.     Chest pain during pregnancy     Maternal iron deficiency anemia affecting pregnancy in third trimester, antepartum 5/10/2022    Postpartum depression     no meds, second pregnancy    Postural dizziness with presyncope        Past Surgical History:        Procedure Laterality Date    APPENDECTOMY,W OTHR C       SECTION      CYST REMOVAL      HERNIA REPAIR      umbilical       Social History:    Social History     Tobacco Use    Smoking status: Never    Smokeless tobacco: Never   Substance Use Topics    Alcohol use:  No                                Counseling given: Not Answered      Vital Signs (Current):   Vitals:    22 2324 22 0204   BP: 133/71 124/70   Pulse: 99 93   Resp: 16 18   Temp: 98.3 °F (36.8 °C) 98.1 °F (36.7 °C)   TempSrc: Oral Oral   SpO2: 98%                                               BP Readings from Last 3 Encounters:   22 124/70   08/10/22 108/60   22 102/60       NPO Status:                                                                                 BMI:   Wt Readings from Last 3 Encounters:   08/10/22 131 lb (59.4 kg)   22 128 lb (58.1 kg)   22 127 lb (57.6 kg)     There is no height or weight on file to calculate BMI.    CBC:   Lab Results   Component Value Date/Time    WBC 15.4 2022 11:42 PM    RBC 4.82 2022 11:42 PM    HGB 11.8 2022 11:42 PM    HCT 37.4 2022 11:42 PM    MCV 77.6 2022 11:42 PM    RDW 15.4 2022 01:22 PM     2022 11:42 PM       CMP:   Lab Results   Component Value Date/Time     2022 01:22 PM    K 3.8 2022 01:22 PM     2022 01:22 PM    CO2 22 2022 01:22 PM    BUN 4 2022 01:22 PM    CREATININE 0.60 2022 01:22 PM    GFRAA >60 2022 01:22 PM    AGRATIO 0.7 05/10/2022 12:27 PM    LABGLOM >60 2022 01:22 PM    GLUCOSE 104 2022 01:22 PM    PROT 6.3 2022 01:22 PM    CALCIUM 9.2 2022 01:22 PM    BILITOT 0.5 2022 01:22 PM    ALKPHOS 97 2022 01:22 PM    ALKPHOS 66 05/10/2022 12:27 PM    AST 11 06/14/2022 01:22 PM    ALT 9 06/14/2022 01:22 PM       POC Tests: No results for input(s): POCGLU, POCNA, POCK, POCCL, POCBUN, POCHEMO, POCHCT in the last 72 hours. Coags: No results found for: PROTIME, INR, APTT    HCG (If Applicable): No results found for: PREGTESTUR, PREGSERUM, HCG, HCGQUANT     ABGs: No results found for: PHART, PO2ART, GDJ5UPK, LUV3UEW, BEART, H2AYBLLJ     Type & Screen (If Applicable):  No results found for: LABABO, LABRH    Drug/Infectious Status (If Applicable):  No results found for: HIV, HEPCAB    COVID-19 Screening (If Applicable): No results found for: COVID19        Anesthesia Evaluation  Patient summary reviewed and Nursing notes reviewed no history of anesthetic complications:   Airway: Mallampati: II  TM distance: >3 FB   Neck ROM: full  Mouth opening: > = 3 FB   Dental: normal exam         Pulmonary:Negative Pulmonary ROS breath sounds clear to auscultation                             Cardiovascular:Negative CV ROS  Exercise tolerance: good (>4 METS),           Rhythm: regular  Rate: normal                    Neuro/Psych:   (+) headaches:, psychiatric history:depression/anxiety             GI/Hepatic/Renal:   (+) GERD:,          ROS comment: N/V this pregnancy. Endo/Other:                      ROS comment: G5 TOLAC with 2 prior VBACs Abdominal:             Vascular: negative vascular ROS. Other Findings:           Anesthesia Plan      epidural     ASA 2             Anesthetic plan and risks discussed with patient, spouse and mother.                         Ramakrishna Angel MD   8/13/2022

## 2022-08-14 VITALS
TEMPERATURE: 98 F | RESPIRATION RATE: 17 BRPM | SYSTOLIC BLOOD PRESSURE: 94 MMHG | DIASTOLIC BLOOD PRESSURE: 61 MMHG | HEART RATE: 68 BPM | OXYGEN SATURATION: 100 %

## 2022-08-14 RX ORDER — IBUPROFEN 800 MG/1
800 TABLET ORAL EVERY 8 HOURS PRN
Qty: 30 TABLET | Refills: 0 | Status: SHIPPED | OUTPATIENT
Start: 2022-08-14 | End: 2022-08-25

## 2022-08-14 NOTE — ANESTHESIA POSTPROCEDURE EVALUATION
Department of Anesthesiology  Postprocedure Note    Patient: Antionette Niño  MRN: [id number]  YOB: 1995  Date of evaluation: 8/14/2022      Procedure Summary     Date: 08/13/22 Room / Location:     Anesthesia Start: 0236 Anesthesia Stop: 3022    Procedure: Labor Analgesia Diagnosis:     Scheduled Providers:  Responsible Provider: Surya Mcpherson MD    Anesthesia Type: epidural ASA Status: 2          Anesthesia Type: No value filed. Dav Phase I:      Dav Phase II:        Anesthesia Post Evaluation    Patient location during evaluation: floor  Patient participation: complete - patient participated  Level of consciousness: awake and alert  Airway patency: patent  Nausea: well controlled. Complications: no  Cardiovascular status: acceptable.   Respiratory status: acceptable  Hydration status: stable

## 2022-08-14 NOTE — PROGRESS NOTES
Post-Partum Day Number 1 Progress Note    Patient doing well  without significant complaints. Pain controlled on current medication. Voiding without difficulty, normal lochia. Vitals:  BP 94/61   Pulse 68   Temp 98 °F (36.7 °C) (Oral)   Resp 17   LMP 11/09/2021   SpO2 100%   Breastfeeding Unknown     Vital signs stable, afebrile. Exam:  Patient without distress. Heart rrr  Lungs cta b&s               Abdomen soft, fundus firm at level of umbilicus, nontender. Lower extremities are negative for swelling, cords or tenderness. No results found for: Tavares [address]     Lab Results   Component Value Date/Time    ABORH A NEGATIVE 08/12/2022 11:42 PM    ABSCRNEXT negative 01/10/2022 02:54 PM    RUBELLAEXT immune 01/10/2022 02:54 PM     Lab Results   Component Value Date/Time    HGB 11.8 08/12/2022 11:42 PM     Baby rh negative also     Assessment and Plan:  Patient appears to be having uncomplicated post-partum course. Continue routine post-delivery care and maternal education. Breastfeeding - will discharge home.

## 2022-08-14 NOTE — PLAN OF CARE
Problem: Postpartum  Goal: Experiences normal postpartum course  Description:  Postpartum OB-Pregnancy care plan goal which identifies if the mother is experiencing a normal postpartum course  2022 by Rita Sanders RN  Outcome: Progressing  2022 1820 by Rajni Osman RN  Outcome: Progressing  2022 1255 by Jessica Stiles RN  Outcome: Progressing  Goal: Appropriate maternal -  bonding  Description:  Postpartum OB-Pregnancy care plan goal which identifies if the mother and  are bonding appropriately  2022 by Rita Sanders RN  Outcome: Progressing  2022 1820 by Rajni Osman RN  Outcome: Progressing  2022 1255 by Jessica Stiles RN  Outcome: Progressing  Goal: Establishment of infant feeding pattern  Description:  Postpartum OB-Pregnancy care plan goal which identifies if the mother is establishing a feeding pattern with their   2022 by Rita Sanders RN  Outcome: Progressing  2022 1820 by Rajni Osman RN  Outcome: Progressing  2022 1255 by Jessica Stiles RN  Outcome: Progressing  Goal: Incisions, wounds, or drain sites healing without S/S of infection  2022 by Rajni Osman RN  Outcome: Progressing  2022 1255 by Jessica Stiles RN  Outcome: Progressing     Problem: Pain  Goal: Verbalizes/displays adequate comfort level or baseline comfort level  2022 by Rita Sanders RN  Outcome: Progressing  2022 1820 by Rajni Osman RN  Outcome: Progressing  2022 1255 by Jessica Stiles RN  Outcome: Progressing  Flowsheets (Taken 2022 0847)  Verbalizes/displays adequate comfort level or baseline comfort level: Encourage patient to monitor pain and request assistance     Problem: Infection - Adult  Goal: Absence of infection at discharge  2022 by Rita Sanders RN  Outcome: Progressing  2022 by Rajni Osman RN  Outcome: Progressing  2022 1255 by Amandeep Kraft RN  Outcome: Progressing  Goal: Absence of infection during hospitalization  2022 by Kiley Townsend RN  Outcome: Progressing  2022 1820 by Lexis Mora RN  Outcome: Progressing  2022 1255 by Amandeep Kraft RN  Outcome: Progressing  Goal: Absence of fever/infection during anticipated neutropenic period  2022 by Kiley Townsend RN  Outcome: Progressing  2022 1820 by Lexis Mora RN  Outcome: Progressing  2022 1255 by Amandeep Kraft RN  Outcome: Progressing     Problem: Safety - Adult  Goal: Free from fall injury  2022 by Kiley Townsend RN  Outcome: Progressing  2022 1820 by Lexis Mora RN  Outcome: Progressing  2022 1255 by Amandeep Kraft RN  Outcome: Progressing     Problem: Discharge Planning  Goal: Discharge to home or other facility with appropriate resources  2022 by Kiley Townsend RN  Outcome: Progressing  2022 1820 by Lexis Mora RN  Outcome: Progressing  2022 1255 by Amandeep Kraft RN  Outcome: Progressing     Problem: Chronic Conditions and Co-morbidities  Goal: Patient's chronic conditions and co-morbidity symptoms are monitored and maintained or improved  2022 by Kiley Townsend RN  Outcome: Progressing  2022 1820 by Lexis Mora RN  Outcome: Progressing  2022 1255 by Amandeep Kraft RN  Outcome: Progressing     Problem: Vaginal Birth or  Section  Goal: Fetal and maternal status remain reassuring during the birth process  Description:  Birth OB-Pregnancy care plan goal which identifies if the fetal and maternal status remain reassuring during the birth process  2022 by Lexis Mora RN  Outcome: Completed  2022 1255 by Amandeep Kraft RN  Outcome: Progressing

## 2022-08-14 NOTE — DISCHARGE SUMMARY
Obstetrical Discharge Summary     Name: John Corbett MRN: [id number]  SSN: [id number]    YOB: 1995  Age: 31 y.o. Sex: female      Allergies: Hydromorphone, Morphine, and Adhesive tape    Admit Date: 2022    Discharge Date: 2022     Admitting Physician: Hillary Hart DO     Attending Physician:  Hillary Hart DO     * Admission Diagnoses: Normal labor [O80, Z37.9]  39 weeks gestation of pregnancy [Z3A.39]    * Discharge Diagnoses: Additional Diagnoses: Principal Problem:    Normal labor  Active Problems:    39 weeks gestation of pregnancy  Resolved Problems:    * No resolved hospital problems. *     Lab Results   Component Value Date/Time    ABORH A NEGATIVE 2022 11:42 PM    RUBELLAEXT immune 01/10/2022 02:54 PM      Immunization History   Administered Date(s) Administered    Influenza Virus Vaccine 2019    Rho (D) Immune Globulin 2017, 2019    Tdap (Boostrix, Adacel) 2019       * Procedures:             * Discharge Condition: [place] [facility] Course: Normal hospital course following the delivery.     * Disposition: Home    Discharge Medications:      Medication List        START taking these medications      benzocaine-menthol 20-0.5 % Aero spray  Commonly known as: DERMOPLAST  Apply topically as needed for Pain     ibuprofen 800 MG tablet  Commonly known as: ADVIL;MOTRIN  Take 1 tablet by mouth every 8 hours as needed for Pain            CONTINUE taking these medications      PRENATAL VITAMINS PO            STOP taking these medications      ferrous sulfate 325 (65 Fe) MG tablet  Commonly known as: IRON 325               Where to Get Your Medications        These medications were sent to [facility]/pharmacy #[id number]- [address] [facility], [address] [place] [address]      Phone: [phone]   benzocaine-menthol 20-0.5 % Aero spray  ibuprofen 800 MG tablet         * Follow-up Care/Patient Instructions:   Activity: no sex for 6 weeks  Diet: regular diet  Wound Care: as directed     Follow up: 2 weeks

## 2022-08-14 NOTE — DISCHARGE INSTRUCTIONS
Pelvic rest for 6wk  NO driving for 2wk or while taking pain medications  NO tub baths, pools, or hot tubs for 6wk. NO push/pull motion such as vacuuming or sweeping for 2wk  NO lifting >10lb for 2wk. Vaginal Childbirth: Care Instructions  Overview     Vaginal birth means delivering a baby through the birth canal (vagina). During labor, the uterus tightens (contracts) regularly to thin and open the cervixand to push the baby out through the birth canal.  Your body will slowly heal in the next few weeks. It's easy to get too tired and overwhelmed during the first weeks after your baby is born. Changes in your hormones can shift your mood without warning. You may find it hard to meet theextra demands on your energy and time. Take it easy on yourself. Follow-up care is a key part of your treatment and safety. Be sure to make and go to all appointments, and call your doctor if you are having problems. It's also a good idea to know your test results and keep alist of the medicines you take. How can you care for yourself at home? Vaginal bleeding and cramps  After delivery, you will have a bloody discharge from your vagina. This will turn pink within a week and then white or yellow after about 10 days. It may last for 2 to 4 weeks or longer, until the uterus has healed. Use sanitary pads until you stop bleeding. Using pads makes it easier to monitor your bleeding. Don't worry if you pass some blood clots, as long as they are smaller than a golf ball. If you have a tear or stitches in your vaginal area, change the pad at least every 4 hours. This will help prevent soreness and infection. You may have cramps for the first few days after childbirth. These are normal and occur as the uterus shrinks to normal size. Take an over-the-counter pain medicine, such as acetaminophen (Tylenol), ibuprofen (Advil, Motrin), or naproxen (Aleve), for cramps. Read and follow all instructions on the label.  Do not take aspirin, because it can cause more bleeding. Do not take two or more pain medicines at the same time unless the doctor told you to. Many pain medicines have acetaminophen, which is Tylenol. Too much acetaminophen (Tylenol) can be harmful. Stitches  If you have stitches, they will dissolve on their own and don't need to be removed. Follow your doctor's instructions for cleaning the stitched area. Put ice or a cold pack on your painful area for 10 to 20 minutes at a time, several times a day, for the first few days. Put a thin cloth between the ice and your skin. Sit in a few inches of warm water (sitz bath) 3 times a day and after bowel movements. The warm water helps with pain and itching. If you don't have a tub, a warm shower might help. Breast fullness  Your breasts may overfill (engorge) in the first few days after nursing, don't put warmth on your breasts or touch your breasts. Wear a bra that fits well and use ice until the fullness goes away. This usually takes 2 to 3 days. Put ice or a cold pack on your breast after nursing to reduce swelling and pain. Put a thin cloth between the ice and your skin. Activity  Eat a balanced diet. Don't try to lose weight by cutting calories. Keep taking your prenatal vitamins, or take a multivitamin. Get as much rest as you can. Try to take naps when your baby sleeps during the day. Get some exercise every day. But don't do any heavy exercise until your doctor says it is okay. Wait until you are healed (about 4 to 6 weeks) before you have sexual intercourse. Your doctor will tell you when it is okay to have sex. If you don't want to get pregnant, talk to your doctor about birth control. You can get pregnant even before your period returns. Also, you can get pregnant while you are breastfeeding. Mental health  It's normal to have some sadness, anxiety, sleeplessness, and mood swings after you go home.  If you feel upset or hopeless for more than a few days or are having trouble doing the things you need to do, talk to your doctor. Constipation and hemorrhoids  Drink plenty of fluids. If you have kidney, heart, or liver disease and have to limit fluids, talk with your doctor before you increase the amount of fluids you drink. Eat plenty of fiber each day. Have a bran muffin or bran cereal for breakfast. Try eating a piece of fruit for a mid-afternoon snack. For painful, itchy hemorrhoids, put ice or a cold pack on the area several times a day for 10 minutes at a time. Follow this by putting a warm compress on the area for another 10 to 20 minutes or by sitting in a shallow, warm bath. When should you call for help? Share this information with your partner, family, or a friend. They can help you watch for warning signs. Call 911 anytime you think you may need emergency care. For example, call if:    You have thoughts of harming yourself, your baby, or another person. You passed out (lost consciousness). You have chest pain, are short of breath, or cough up blood. You have a seizure. Call your doctor now or seek immediate medical care if:    You have signs of hemorrhage (too much bleeding), such as:  Heavy vaginal bleeding. This means that you are soaking through one or more pads in an hour. Or you pass blood clots bigger than an egg. Feeling dizzy or lightheaded, or you feel like you may faint. Feeling so tired or weak that you cannot do your usual activities. A fast or irregular heartbeat. New or worse belly pain. You have signs of infection, such as:  A fever. Vaginal discharge that smells bad. New or worse belly pain. You have symptoms of a blood clot in your leg (called a deep vein thrombosis), such as:  Pain in the calf, back of the knee, thigh, or groin. Redness and swelling in your leg or groin. You have signs of preeclampsia, such as:  Sudden swelling of your face, hands, or feet.   New vision problems (such as dimness, blurring, or

## 2022-08-24 NOTE — PROGRESS NOTES
2 Week Postpartum Visit    Name: Ishan Ware    Date: 2022    Age: 31 y.o.    OB History          5    Para   4    Term   4            AB   1    Living   4         SAB        IAB   1    Ectopic        Molar        Multiple   0    Live Births   4              Ht 5' 4\" (1.626 m)   Wt 114 lb 12.8 oz (52.1 kg)   LMP 2021        Delivered by Dr. Bhavani Kaur on 22 by  with a left labial laceration  No PP depression  Infant's Name: Brandy Matias  Infant's Gender: male  Birth Weight: 7lb 5.8oz  Feeding: breast and bottle feeding with breast milk   Desired Contraception: BTL- wants referral    Current Outpatient Medications   Medication Sig Dispense Refill    Prenatal Vit-Fe Fumarate-FA (PRENATAL VITAMINS PO) Take by mouth       No current facility-administered medications for this visit.        [email or web address]    Physical Exam  Physical Exam   Left labial tear healing well, stitches present, no infection  No sex til 6 weeks, will get referral for tubal  Follow up 4 weeks

## 2022-08-25 ENCOUNTER — POSTPARTUM VISIT (OUTPATIENT)
Dept: OBGYN CLINIC | Age: 27
End: 2022-08-25

## 2022-08-25 VITALS
HEIGHT: 64 IN | WEIGHT: 114.8 LBS | DIASTOLIC BLOOD PRESSURE: 64 MMHG | BODY MASS INDEX: 19.6 KG/M2 | SYSTOLIC BLOOD PRESSURE: 106 MMHG

## 2022-08-25 PROCEDURE — 99902 PR PRENATAL VISIT: CPT | Performed by: OBSTETRICS & GYNECOLOGY

## 2022-12-21 DIAGNOSIS — D50.9 IRON DEFICIENCY ANEMIA, UNSPECIFIED IRON DEFICIENCY ANEMIA TYPE: Primary | ICD-10-CM
